# Patient Record
Sex: MALE | Race: WHITE | NOT HISPANIC OR LATINO | Employment: FULL TIME | URBAN - METROPOLITAN AREA
[De-identification: names, ages, dates, MRNs, and addresses within clinical notes are randomized per-mention and may not be internally consistent; named-entity substitution may affect disease eponyms.]

---

## 2023-07-16 PROBLEM — F41.9 ANXIETY: Status: ACTIVE | Noted: 2023-07-16

## 2023-07-16 PROBLEM — E55.9 VITAMIN D DEFICIENCY: Status: ACTIVE | Noted: 2023-07-16

## 2023-07-16 PROBLEM — J45.909 ASTHMA (HHS-HCC): Status: ACTIVE | Noted: 2023-07-16

## 2023-07-16 PROBLEM — R63.2 BINGE EATING: Status: ACTIVE | Noted: 2023-07-16

## 2023-07-16 PROBLEM — E66.01 OBESITY, MORBID, BMI 40.0-49.9 (MULTI): Status: ACTIVE | Noted: 2023-07-16

## 2023-07-16 PROBLEM — R03.0 BLOOD PRESSURE ELEVATED WITHOUT HISTORY OF HTN: Status: ACTIVE | Noted: 2023-07-16

## 2023-07-16 PROBLEM — G47.19 EXCESSIVE DAYTIME SLEEPINESS: Status: ACTIVE | Noted: 2023-07-16

## 2023-07-16 PROBLEM — G47.33 OSA (OBSTRUCTIVE SLEEP APNEA): Status: ACTIVE | Noted: 2023-07-16

## 2023-07-16 PROBLEM — F41.8 DEPRESSION WITH ANXIETY: Status: ACTIVE | Noted: 2023-07-16

## 2023-07-16 PROBLEM — B00.9 HERPES SIMPLEX: Status: ACTIVE | Noted: 2023-07-16

## 2023-07-16 RX ORDER — ERGOCALCIFEROL 1.25 MG/1
CAPSULE ORAL
COMMUNITY
End: 2023-08-03 | Stop reason: ALTCHOICE

## 2023-07-16 RX ORDER — OMEPRAZOLE 20 MG/1
CAPSULE, DELAYED RELEASE ORAL
COMMUNITY
End: 2023-08-03 | Stop reason: ALTCHOICE

## 2023-07-16 RX ORDER — ESCITALOPRAM OXALATE 20 MG/1
1 TABLET ORAL DAILY
COMMUNITY
Start: 2018-06-14 | End: 2023-08-03 | Stop reason: WASHOUT

## 2023-07-16 RX ORDER — MOMETASONE FUROATE AND FORMOTEROL FUMARATE DIHYDRATE 100; 5 UG/1; UG/1
AEROSOL RESPIRATORY (INHALATION)
COMMUNITY
Start: 2018-02-09 | End: 2023-08-03 | Stop reason: SDUPTHER

## 2023-07-16 RX ORDER — LORATADINE 10 MG/1
TABLET ORAL
COMMUNITY
End: 2023-11-21 | Stop reason: ALTCHOICE

## 2023-07-16 RX ORDER — SERTRALINE HYDROCHLORIDE 50 MG/1
TABLET, FILM COATED ORAL
COMMUNITY
Start: 2022-07-05 | End: 2023-08-03 | Stop reason: ALTCHOICE

## 2023-07-16 RX ORDER — ALBUTEROL SULFATE 90 UG/1
AEROSOL, METERED RESPIRATORY (INHALATION)
COMMUNITY
Start: 2013-03-25 | End: 2023-08-03 | Stop reason: SDUPTHER

## 2023-07-17 ENCOUNTER — APPOINTMENT (OUTPATIENT)
Dept: PRIMARY CARE | Facility: CLINIC | Age: 48
End: 2023-07-17
Payer: COMMERCIAL

## 2023-08-02 ENCOUNTER — APPOINTMENT (OUTPATIENT)
Dept: PRIMARY CARE | Facility: CLINIC | Age: 48
End: 2023-08-02
Payer: COMMERCIAL

## 2023-08-03 ENCOUNTER — OFFICE VISIT (OUTPATIENT)
Dept: PRIMARY CARE | Facility: CLINIC | Age: 48
End: 2023-08-03
Payer: COMMERCIAL

## 2023-08-03 VITALS
SYSTOLIC BLOOD PRESSURE: 131 MMHG | OXYGEN SATURATION: 98 % | WEIGHT: 284 LBS | HEART RATE: 80 BPM | DIASTOLIC BLOOD PRESSURE: 83 MMHG | BODY MASS INDEX: 39.76 KG/M2 | TEMPERATURE: 98.6 F | HEIGHT: 71 IN

## 2023-08-03 DIAGNOSIS — F41.9 ANXIETY: ICD-10-CM

## 2023-08-03 DIAGNOSIS — Z00.00 ROUTINE GENERAL MEDICAL EXAMINATION AT A HEALTH CARE FACILITY: ICD-10-CM

## 2023-08-03 DIAGNOSIS — J45.909 ASTHMA, UNSPECIFIED ASTHMA SEVERITY, UNSPECIFIED WHETHER COMPLICATED, UNSPECIFIED WHETHER PERSISTENT (HHS-HCC): Primary | ICD-10-CM

## 2023-08-03 DIAGNOSIS — G47.33 OSA (OBSTRUCTIVE SLEEP APNEA): ICD-10-CM

## 2023-08-03 DIAGNOSIS — F41.8 DEPRESSION WITH ANXIETY: ICD-10-CM

## 2023-08-03 DIAGNOSIS — I10 HYPERTENSION, UNSPECIFIED TYPE: ICD-10-CM

## 2023-08-03 DIAGNOSIS — G47.19 EXCESSIVE DAYTIME SLEEPINESS: ICD-10-CM

## 2023-08-03 DIAGNOSIS — R61 EXCESSIVE SWEATING: ICD-10-CM

## 2023-08-03 DIAGNOSIS — E66.01 OBESITY, MORBID, BMI 40.0-49.9 (MULTI): ICD-10-CM

## 2023-08-03 PROCEDURE — 3079F DIAST BP 80-89 MM HG: CPT | Performed by: INTERNAL MEDICINE

## 2023-08-03 PROCEDURE — 93000 ELECTROCARDIOGRAM COMPLETE: CPT | Performed by: INTERNAL MEDICINE

## 2023-08-03 PROCEDURE — 99204 OFFICE O/P NEW MOD 45 MIN: CPT | Performed by: INTERNAL MEDICINE

## 2023-08-03 PROCEDURE — 3075F SYST BP GE 130 - 139MM HG: CPT | Performed by: INTERNAL MEDICINE

## 2023-08-03 PROCEDURE — 1036F TOBACCO NON-USER: CPT | Performed by: INTERNAL MEDICINE

## 2023-08-03 RX ORDER — TRAZODONE HYDROCHLORIDE 50 MG/1
50 TABLET ORAL NIGHTLY PRN
Qty: 30 TABLET | Refills: 0 | Status: SHIPPED | OUTPATIENT
Start: 2023-08-03 | End: 2023-08-26

## 2023-08-03 RX ORDER — MOMETASONE FUROATE AND FORMOTEROL FUMARATE DIHYDRATE 100; 5 UG/1; UG/1
1 AEROSOL RESPIRATORY (INHALATION)
Qty: 8.8 G | Refills: 1 | Status: SHIPPED | OUTPATIENT
Start: 2023-08-03 | End: 2024-05-18 | Stop reason: SDUPTHER

## 2023-08-03 RX ORDER — OLMESARTAN MEDOXOMIL 20 MG/1
TABLET ORAL
COMMUNITY
Start: 2023-05-16 | End: 2023-09-15 | Stop reason: SDUPTHER

## 2023-08-03 RX ORDER — ALBUTEROL SULFATE 90 UG/1
1 AEROSOL, METERED RESPIRATORY (INHALATION) EVERY 6 HOURS PRN
Qty: 18 G | Refills: 1 | Status: SHIPPED | OUTPATIENT
Start: 2023-08-03 | End: 2023-08-03 | Stop reason: SDUPTHER

## 2023-08-03 RX ORDER — MOMETASONE FUROATE AND FORMOTEROL FUMARATE DIHYDRATE 100; 5 UG/1; UG/1
1 AEROSOL RESPIRATORY (INHALATION)
Qty: 8.8 G | Refills: 1 | Status: SHIPPED | OUTPATIENT
Start: 2023-08-03 | End: 2023-08-03 | Stop reason: SDUPTHER

## 2023-08-03 RX ORDER — ALBUTEROL SULFATE 90 UG/1
2 AEROSOL, METERED RESPIRATORY (INHALATION) EVERY 6 HOURS PRN
Qty: 8.5 G | Refills: 3 | Status: SHIPPED | OUTPATIENT
Start: 2023-08-03 | End: 2024-08-02

## 2023-08-03 RX ORDER — BUSPIRONE HYDROCHLORIDE 10 MG/1
10 TABLET ORAL 2 TIMES DAILY
Qty: 60 TABLET | Refills: 1 | Status: SHIPPED | OUTPATIENT
Start: 2023-08-03 | End: 2023-09-13 | Stop reason: SDUPTHER

## 2023-08-03 RX ORDER — ALBUTEROL SULFATE 90 UG/1
1 AEROSOL, METERED RESPIRATORY (INHALATION) EVERY 6 HOURS PRN
Qty: 18 G | Refills: 1 | Status: SHIPPED | OUTPATIENT
Start: 2023-08-03 | End: 2023-08-03

## 2023-08-03 ASSESSMENT — LIFESTYLE VARIABLES
AUDIT-C TOTAL SCORE: 2
HOW OFTEN DO YOU HAVE A DRINK CONTAINING ALCOHOL: MONTHLY OR LESS
SKIP TO QUESTIONS 9-10: 0
HOW OFTEN DO YOU HAVE SIX OR MORE DRINKS ON ONE OCCASION: LESS THAN MONTHLY
HOW MANY STANDARD DRINKS CONTAINING ALCOHOL DO YOU HAVE ON A TYPICAL DAY: 1 OR 2

## 2023-08-03 ASSESSMENT — PATIENT HEALTH QUESTIONNAIRE - PHQ9
2. FEELING DOWN, DEPRESSED OR HOPELESS: NOT AT ALL
1. LITTLE INTEREST OR PLEASURE IN DOING THINGS: NOT AT ALL
SUM OF ALL RESPONSES TO PHQ9 QUESTIONS 1 AND 2: 0

## 2023-08-03 ASSESSMENT — COLUMBIA-SUICIDE SEVERITY RATING SCALE - C-SSRS: 1. IN THE PAST MONTH, HAVE YOU WISHED YOU WERE DEAD OR WISHED YOU COULD GO TO SLEEP AND NOT WAKE UP?: NO

## 2023-08-03 ASSESSMENT — PAIN SCALES - GENERAL: PAINLEVEL: 0-NO PAIN

## 2023-08-03 NOTE — PROGRESS NOTES
"Subjective   Patient ID: James Brambila is a 47 y.o. male who presents for New Patient Visit and Anxiety.    HPI     Review of Systems    Objective   /83   Pulse 80   Temp 37 °C (98.6 °F)   Ht 1.803 m (5' 11\")   Wt 129 kg (284 lb)   SpO2 98%   BMI 39.61 kg/m²     Physical Exam    Assessment/Plan          "

## 2023-08-03 NOTE — PROGRESS NOTES
"Subjective   Patient ID: James Brambila is a 47 y.o. male who presents for New Patient Visit and Anxiety.    HPIPatient presents to clinic complaining of insomnia, feeling anxious at times, having difficulty in relaxation, unable to enjoy his life, gets worried about several small things, excessive sweating persistent weight gain, outbursts of anger ,crying spells and has been feeling uptight over the past several years.  He has tried Zoloft, Lexapro and Prozac without any significant improvement in his symptoms.  He denies any suicidal ideation, agitation, restlessness, belligerent behavior, shortness of breath, abdominal pain and nausea vomiting.  He is also requesting a routine physical examination.  EKG done in the office shows sinus rhythm at 83 bpm with normal axis normal interval without any ischemic changes.  He wants to get established with the office and had been a former patient of Dr. Cabrera   Past medical history significant for Depression/anxiety,morbid obesity,asthma,anni on cpap,and Hypertension    Review of Systems   Constitutional: Negative.    HENT: Negative.     Eyes: Negative.    Respiratory: Negative.     Cardiovascular: Negative.    Gastrointestinal: Negative.    Endocrine: Negative.         With excessive sweating   Genitourinary: Negative.    Musculoskeletal: Negative.    Skin: Negative.    Allergic/Immunologic: Negative.    Neurological: Negative.    Hematological: Negative.    Psychiatric/Behavioral:  Positive for decreased concentration and sleep disturbance. The patient is nervous/anxious.        Objective   /83   Pulse 80   Temp 37 °C (98.6 °F)   Ht 1.803 m (5' 11\")   Wt 129 kg (284 lb)   SpO2 98%   BMI 39.61 kg/m²     Physical Exam  Constitutional:       Appearance: Normal appearance. He is obese.   HENT:      Right Ear: Tympanic membrane normal.      Left Ear: Tympanic membrane and ear canal normal.      Nose: Nose normal.   Neck:      Vascular: No carotid bruit. "   Cardiovascular:      Rate and Rhythm: Normal rate.   Pulmonary:      Effort: No respiratory distress.      Breath sounds: No stridor. No wheezing.   Abdominal:      Palpations: Abdomen is soft.      Tenderness: There is no guarding or rebound.   Skin:     Coloration: Skin is not jaundiced.   Neurological:      General: No focal deficit present.      Mental Status: He is alert and oriented to person, place, and time.   Psychiatric:         Mood and Affect: Mood normal.         Assessment/Plan   Patient is given trial with trazodone and BuSpar regarding depression and anxiety.  He will be referred to psychiatry clinic for further management of his above-mentioned symptoms.  He probably has component obsessive-compulsive disorder.  He is encouraged to diet, exercise and do lifestyle modification for morbid obesity.  He will be scheduled for routine blood work.  He will return to clinic in 2 weeks for follow-up visit.

## 2023-08-04 ENCOUNTER — LAB (OUTPATIENT)
Dept: LAB | Facility: LAB | Age: 48
End: 2023-08-04
Payer: COMMERCIAL

## 2023-08-04 DIAGNOSIS — R61 EXCESSIVE SWEATING: ICD-10-CM

## 2023-08-04 DIAGNOSIS — E66.01 OBESITY, MORBID, BMI 40.0-49.9 (MULTI): ICD-10-CM

## 2023-08-04 DIAGNOSIS — Z00.00 ROUTINE GENERAL MEDICAL EXAMINATION AT A HEALTH CARE FACILITY: ICD-10-CM

## 2023-08-04 DIAGNOSIS — I10 HYPERTENSION, UNSPECIFIED TYPE: ICD-10-CM

## 2023-08-04 LAB
ALANINE AMINOTRANSFERASE (SGPT) (U/L) IN SER/PLAS: 31 U/L (ref 10–52)
ALBUMIN (G/DL) IN SER/PLAS: 4.3 G/DL (ref 3.4–5)
ALKALINE PHOSPHATASE (U/L) IN SER/PLAS: 81 U/L (ref 33–120)
ANION GAP IN SER/PLAS: 14 MMOL/L (ref 10–20)
APPEARANCE, URINE: CLEAR
ASPARTATE AMINOTRANSFERASE (SGOT) (U/L) IN SER/PLAS: 18 U/L (ref 9–39)
BASOPHILS (10*3/UL) IN BLOOD BY AUTOMATED COUNT: 0.05 X10E9/L (ref 0–0.1)
BASOPHILS/100 LEUKOCYTES IN BLOOD BY AUTOMATED COUNT: 0.7 % (ref 0–2)
BILIRUBIN TOTAL (MG/DL) IN SER/PLAS: 0.4 MG/DL (ref 0–1.2)
BILIRUBIN, URINE: NEGATIVE
BLOOD, URINE: NEGATIVE
CALCIUM (MG/DL) IN SER/PLAS: 9.3 MG/DL (ref 8.6–10.6)
CARBON DIOXIDE, TOTAL (MMOL/L) IN SER/PLAS: 29 MMOL/L (ref 21–32)
CHLORIDE (MMOL/L) IN SER/PLAS: 100 MMOL/L (ref 98–107)
CHOLESTEROL (MG/DL) IN SER/PLAS: 164 MG/DL (ref 0–199)
CHOLESTEROL IN HDL (MG/DL) IN SER/PLAS: 36.2 MG/DL
CHOLESTEROL/HDL RATIO: 4.5
COLOR, URINE: YELLOW
CREATININE (MG/DL) IN SER/PLAS: 0.89 MG/DL (ref 0.5–1.3)
EOSINOPHILS (10*3/UL) IN BLOOD BY AUTOMATED COUNT: 0.13 X10E9/L (ref 0–0.7)
EOSINOPHILS/100 LEUKOCYTES IN BLOOD BY AUTOMATED COUNT: 1.7 % (ref 0–6)
ERYTHROCYTE DISTRIBUTION WIDTH (RATIO) BY AUTOMATED COUNT: 12.9 % (ref 11.5–14.5)
ERYTHROCYTE MEAN CORPUSCULAR HEMOGLOBIN CONCENTRATION (G/DL) BY AUTOMATED: 31.3 G/DL (ref 32–36)
ERYTHROCYTE MEAN CORPUSCULAR VOLUME (FL) BY AUTOMATED COUNT: 89 FL (ref 80–100)
ERYTHROCYTES (10*6/UL) IN BLOOD BY AUTOMATED COUNT: 4.92 X10E12/L (ref 4.5–5.9)
ESTIMATED AVERAGE GLUCOSE FOR HBA1C: 332 MG/DL
GFR MALE: >90 ML/MIN/1.73M2
GLUCOSE (MG/DL) IN SER/PLAS: 371 MG/DL (ref 74–99)
GLUCOSE, URINE: ABNORMAL MG/DL
HEMATOCRIT (%) IN BLOOD BY AUTOMATED COUNT: 43.8 % (ref 41–52)
HEMOGLOBIN (G/DL) IN BLOOD: 13.7 G/DL (ref 13.5–17.5)
HEMOGLOBIN A1C/HEMOGLOBIN TOTAL IN BLOOD: 13.2 %
IMMATURE GRANULOCYTES/100 LEUKOCYTES IN BLOOD BY AUTOMATED COUNT: 1.2 % (ref 0–0.9)
KETONES, URINE: NEGATIVE MG/DL
LDL: ABNORMAL MG/DL (ref 0–99)
LEUKOCYTE ESTERASE, URINE: NEGATIVE
LEUKOCYTES (10*3/UL) IN BLOOD BY AUTOMATED COUNT: 7.6 X10E9/L (ref 4.4–11.3)
LYMPHOCYTES (10*3/UL) IN BLOOD BY AUTOMATED COUNT: 2.33 X10E9/L (ref 1.2–4.8)
LYMPHOCYTES/100 LEUKOCYTES IN BLOOD BY AUTOMATED COUNT: 30.8 % (ref 13–44)
MONOCYTES (10*3/UL) IN BLOOD BY AUTOMATED COUNT: 0.5 X10E9/L (ref 0.1–1)
MONOCYTES/100 LEUKOCYTES IN BLOOD BY AUTOMATED COUNT: 6.6 % (ref 2–10)
NEUTROPHILS (10*3/UL) IN BLOOD BY AUTOMATED COUNT: 4.46 X10E9/L (ref 1.2–7.7)
NEUTROPHILS/100 LEUKOCYTES IN BLOOD BY AUTOMATED COUNT: 59 % (ref 40–80)
NITRITE, URINE: NEGATIVE
NON HDL CHOLESTEROL: 128 MG/DL
NRBC (PER 100 WBCS) BY AUTOMATED COUNT: 0 /100 WBC (ref 0–0)
PH, URINE: 5 (ref 5–8)
PLATELETS (10*3/UL) IN BLOOD AUTOMATED COUNT: 244 X10E9/L (ref 150–450)
POTASSIUM (MMOL/L) IN SER/PLAS: 3.9 MMOL/L (ref 3.5–5.3)
PROTEIN TOTAL: 7 G/DL (ref 6.4–8.2)
PROTEIN, URINE: NEGATIVE MG/DL
SODIUM (MMOL/L) IN SER/PLAS: 139 MMOL/L (ref 136–145)
SPECIFIC GRAVITY, URINE: 1.04 (ref 1–1.03)
THYROTROPIN (MIU/L) IN SER/PLAS BY DETECTION LIMIT <= 0.05 MIU/L: 3.49 MIU/L (ref 0.44–3.98)
TRIGLYCERIDE (MG/DL) IN SER/PLAS: 497 MG/DL (ref 0–149)
UREA NITROGEN (MG/DL) IN SER/PLAS: 11 MG/DL (ref 6–23)
UROBILINOGEN, URINE: <2 MG/DL (ref 0–1.9)
VLDL: ABNORMAL MG/DL (ref 0–40)

## 2023-08-04 PROCEDURE — 85025 COMPLETE CBC W/AUTO DIFF WBC: CPT

## 2023-08-04 PROCEDURE — 80061 LIPID PANEL: CPT

## 2023-08-04 PROCEDURE — 36415 COLL VENOUS BLD VENIPUNCTURE: CPT

## 2023-08-04 PROCEDURE — 81003 URINALYSIS AUTO W/O SCOPE: CPT

## 2023-08-04 PROCEDURE — 83036 HEMOGLOBIN GLYCOSYLATED A1C: CPT

## 2023-08-04 PROCEDURE — 84443 ASSAY THYROID STIM HORMONE: CPT

## 2023-08-04 PROCEDURE — 80053 COMPREHEN METABOLIC PANEL: CPT

## 2023-08-05 DIAGNOSIS — E11.9 TYPE 2 DIABETES MELLITUS WITHOUT COMPLICATION, WITHOUT LONG-TERM CURRENT USE OF INSULIN (MULTI): Primary | ICD-10-CM

## 2023-08-05 RX ORDER — METFORMIN HYDROCHLORIDE 1000 MG/1
1000 TABLET ORAL
Qty: 60 TABLET | Refills: 2 | Status: SHIPPED | OUTPATIENT
Start: 2023-08-05 | End: 2023-09-13 | Stop reason: SDUPTHER

## 2023-08-05 RX ORDER — GLIMEPIRIDE 4 MG/1
4 TABLET ORAL 2 TIMES DAILY
Qty: 60 TABLET | Refills: 2 | Status: SHIPPED | OUTPATIENT
Start: 2023-08-05 | End: 2023-09-13 | Stop reason: SDUPTHER

## 2023-08-06 ASSESSMENT — ENCOUNTER SYMPTOMS
ALLERGIC/IMMUNOLOGIC NEGATIVE: 1
SLEEP DISTURBANCE: 1
HEMATOLOGIC/LYMPHATIC NEGATIVE: 1
RESPIRATORY NEGATIVE: 1
NERVOUS/ANXIOUS: 1
DECREASED CONCENTRATION: 1
CONSTITUTIONAL NEGATIVE: 1
CARDIOVASCULAR NEGATIVE: 1
NEUROLOGICAL NEGATIVE: 1
EYES NEGATIVE: 1
ENDOCRINE NEGATIVE: 1
MUSCULOSKELETAL NEGATIVE: 1
GASTROINTESTINAL NEGATIVE: 1

## 2023-08-07 ASSESSMENT — ENCOUNTER SYMPTOMS
BLACKOUTS: 0
HUNGER: 1
POLYPHAGIA: 1
SEIZURES: 0
HEADACHES: 1
NERVOUS/ANXIOUS: 1
SPEECH DIFFICULTY: 1
POLYDIPSIA: 1
TREMORS: 0
DIZZINESS: 0
BLURRED VISION: 0
CONFUSION: 1
WEIGHT LOSS: 1
FATIGUE: 1
VISUAL CHANGE: 0
WEAKNESS: 1
SWEATS: 1

## 2023-08-09 ENCOUNTER — OFFICE VISIT (OUTPATIENT)
Dept: PRIMARY CARE | Facility: CLINIC | Age: 48
End: 2023-08-09
Payer: COMMERCIAL

## 2023-08-09 VITALS
BODY MASS INDEX: 39.76 KG/M2 | HEIGHT: 71 IN | TEMPERATURE: 98.6 F | DIASTOLIC BLOOD PRESSURE: 79 MMHG | WEIGHT: 284 LBS | SYSTOLIC BLOOD PRESSURE: 124 MMHG | OXYGEN SATURATION: 98 % | HEART RATE: 98 BPM

## 2023-08-09 DIAGNOSIS — E78.5 DYSLIPIDEMIA: ICD-10-CM

## 2023-08-09 DIAGNOSIS — E11.9 TYPE 2 DIABETES MELLITUS WITHOUT COMPLICATION, WITHOUT LONG-TERM CURRENT USE OF INSULIN (MULTI): ICD-10-CM

## 2023-08-09 DIAGNOSIS — F41.9 ANXIETY: ICD-10-CM

## 2023-08-09 DIAGNOSIS — J45.909 ASTHMA, UNSPECIFIED ASTHMA SEVERITY, UNSPECIFIED WHETHER COMPLICATED, UNSPECIFIED WHETHER PERSISTENT (HHS-HCC): Primary | ICD-10-CM

## 2023-08-09 PROCEDURE — 3078F DIAST BP <80 MM HG: CPT | Performed by: INTERNAL MEDICINE

## 2023-08-09 PROCEDURE — 4010F ACE/ARB THERAPY RXD/TAKEN: CPT | Performed by: INTERNAL MEDICINE

## 2023-08-09 PROCEDURE — 3074F SYST BP LT 130 MM HG: CPT | Performed by: INTERNAL MEDICINE

## 2023-08-09 PROCEDURE — 1036F TOBACCO NON-USER: CPT | Performed by: INTERNAL MEDICINE

## 2023-08-09 PROCEDURE — 3046F HEMOGLOBIN A1C LEVEL >9.0%: CPT | Performed by: INTERNAL MEDICINE

## 2023-08-09 PROCEDURE — 99214 OFFICE O/P EST MOD 30 MIN: CPT | Performed by: INTERNAL MEDICINE

## 2023-08-09 RX ORDER — DEXTROSE 4 G
1 TABLET,CHEWABLE ORAL ONCE
Qty: 1 EACH | Refills: 0 | Status: SHIPPED | OUTPATIENT
Start: 2023-08-09 | End: 2023-08-09

## 2023-08-09 RX ORDER — BLOOD SUGAR DIAGNOSTIC
1 STRIP MISCELLANEOUS 2 TIMES DAILY
Qty: 100 STRIP | Refills: 2 | Status: SHIPPED | OUTPATIENT
Start: 2023-08-09 | End: 2024-08-08

## 2023-08-09 ASSESSMENT — PATIENT HEALTH QUESTIONNAIRE - PHQ9
1. LITTLE INTEREST OR PLEASURE IN DOING THINGS: NOT AT ALL
2. FEELING DOWN, DEPRESSED OR HOPELESS: NOT AT ALL
SUM OF ALL RESPONSES TO PHQ9 QUESTIONS 1 AND 2: 0

## 2023-08-09 ASSESSMENT — COLUMBIA-SUICIDE SEVERITY RATING SCALE - C-SSRS: 1. IN THE PAST MONTH, HAVE YOU WISHED YOU WERE DEAD OR WISHED YOU COULD GO TO SLEEP AND NOT WAKE UP?: NO

## 2023-08-09 NOTE — PROGRESS NOTES
"Subjective   Patient ID: James Brambila is a 47 y.o. male who presents for Follow-up (2 week ).    HPI     Review of Systems    Objective   /79   Pulse 98   Temp 37 °C (98.6 °F)   Ht 1.803 m (5' 11\")   Wt 129 kg (284 lb)   SpO2 98%   BMI 39.61 kg/m²     Physical Exam    Assessment/Plan          "

## 2023-08-09 NOTE — PROGRESS NOTES
Subjective   Patient ID: James Brambila is a 47 y.o. male who presents for Follow-up (2 week ).    Diabetes  He has type 2 diabetes mellitus. No MedicAlert identification noted. The initial diagnosis of diabetes was made 2 days ago. Hypoglycemia symptoms include confusion, headaches, hunger, mood changes, nervousness/anxiousness, sleepiness, speech difficulty and sweats. Pertinent negatives for hypoglycemia include no dizziness, pallor, seizures or tremors. Associated symptoms include fatigue, foot paresthesias, polydipsia, polyphagia, polyuria, weakness and weight loss. Pertinent negatives for diabetes include no blurred vision, no chest pain and no visual change. Pertinent negatives for hypoglycemia complications include no blackouts, no hospitalization, no nocturnal hypoglycemia, no required assistance and no required glucagon injection. Symptoms are stable. Pertinent negatives for diabetic complications include no CVA, heart disease, impotence, nephropathy, peripheral neuropathy, PVD or retinopathy. Risk factors for coronary artery disease include hypertension, obesity, sedentary lifestyle and stress. Current diabetic treatment includes oral agent (dual therapy). He is compliant with treatment all of the time. His weight is stable. When asked about meal planning, he reported none. He has not had a previous visit with a dietitian. He rarely participates in exercise. He does not see a podiatrist.Eye exam is current.    patient was requested to return to clinic to discuss abnormal test results.  He is still having intermittent anxiety and some insomnia.  He denies any cough, abdominal pain, chest pain and palpitation.  Laboratory studies done shows hemoglobin A1c of 13.2%, serum glucose of 371, urinalysis revealed glucosuria,, triglycerides of 497 and other studies have been reviewed and discussed with him.  He has past history of depression/anxiety,morbid obesity,asthma,anni on cpap,and Hypertension       Review of  "Systems   Constitutional:  Positive for fatigue and weight loss.   Eyes:  Negative for blurred vision.   Cardiovascular:  Negative for chest pain.   Endocrine: Positive for polydipsia, polyphagia and polyuria.   Genitourinary:  Negative for impotence.   Skin:  Negative for pallor.   Neurological:  Positive for speech difficulty, weakness and headaches. Negative for dizziness, tremors and seizures.   Psychiatric/Behavioral:  Positive for confusion. The patient is nervous/anxious.        Objective   /79   Pulse 98   Temp 37 °C (98.6 °F)   Ht 1.803 m (5' 11\")   Wt 129 kg (284 lb)   SpO2 98%   BMI 39.61 kg/m²     Physical Exam  Constitutional:       Appearance: Normal appearance. He is obese.   HENT:      Right Ear: Tympanic membrane normal.      Left Ear: Tympanic membrane and ear canal normal.      Nose: Nose normal.   Neck:      Vascular: No carotid bruit.   Cardiovascular:      Rate and Rhythm: Normal rate.   Pulmonary:      Effort: No respiratory distress.      Breath sounds: No stridor. No wheezing.   Abdominal:      Palpations: Abdomen is soft.      Tenderness: There is no abdominal tenderness. There is no guarding or rebound.   Skin:     Coloration: Skin is not jaundiced.      Findings: No bruising.   Neurological:      General: No focal deficit present.      Mental Status: He is alert and oriented to person, place, and time.   Psychiatric:         Mood and Affect: Mood normal.         Assessment/Plan    patient is started on metformin, glimepiride and Ozempic regarding uncontrolled diabetes.  He is encouraged to diet, exercise and do lifestyle modification for obesity.  He is also scheduled to be seen by psychiatrist regarding his depression and anxiety.  He is counseled and educated about home glucose monitoring.  He will continue current medications and will return to clinic in 3 months for follow-up visit.       "

## 2023-08-12 ASSESSMENT — ENCOUNTER SYMPTOMS
SEIZURES: 0
SWEATS: 1
SPEECH DIFFICULTY: 1
WEIGHT LOSS: 1
FATIGUE: 1
POLYDIPSIA: 1
HEADACHES: 1
HUNGER: 1
VISUAL CHANGE: 0
CONFUSION: 1
NERVOUS/ANXIOUS: 1
DIZZINESS: 0
BLURRED VISION: 0
TREMORS: 0
BLACKOUTS: 0
POLYPHAGIA: 1
WEAKNESS: 1

## 2023-08-17 ENCOUNTER — APPOINTMENT (OUTPATIENT)
Dept: PRIMARY CARE | Facility: CLINIC | Age: 48
End: 2023-08-17
Payer: COMMERCIAL

## 2023-08-26 DIAGNOSIS — F41.9 ANXIETY: ICD-10-CM

## 2023-08-26 DIAGNOSIS — F41.8 DEPRESSION WITH ANXIETY: ICD-10-CM

## 2023-08-26 RX ORDER — TRAZODONE HYDROCHLORIDE 50 MG/1
50 TABLET ORAL NIGHTLY PRN
Qty: 30 TABLET | Refills: 0 | Status: SHIPPED | OUTPATIENT
Start: 2023-08-26 | End: 2023-10-04 | Stop reason: ALTCHOICE

## 2023-09-13 DIAGNOSIS — E11.9 TYPE 2 DIABETES MELLITUS WITHOUT COMPLICATION, WITHOUT LONG-TERM CURRENT USE OF INSULIN (MULTI): ICD-10-CM

## 2023-09-13 DIAGNOSIS — F41.8 DEPRESSION WITH ANXIETY: ICD-10-CM

## 2023-09-13 DIAGNOSIS — F41.9 ANXIETY: ICD-10-CM

## 2023-09-13 RX ORDER — METFORMIN HYDROCHLORIDE 1000 MG/1
1000 TABLET ORAL
Qty: 60 TABLET | Refills: 2 | Status: SHIPPED | OUTPATIENT
Start: 2023-09-13 | End: 2023-09-13 | Stop reason: SDUPTHER

## 2023-09-13 RX ORDER — GLIMEPIRIDE 4 MG/1
4 TABLET ORAL 2 TIMES DAILY
Qty: 60 TABLET | Refills: 2 | Status: SHIPPED | OUTPATIENT
Start: 2023-09-13 | End: 2023-09-13 | Stop reason: SDUPTHER

## 2023-09-13 RX ORDER — BUSPIRONE HYDROCHLORIDE 10 MG/1
10 TABLET ORAL 2 TIMES DAILY
Qty: 60 TABLET | Refills: 1 | Status: SHIPPED | OUTPATIENT
Start: 2023-09-13 | End: 2023-11-09 | Stop reason: SDUPTHER

## 2023-09-14 RX ORDER — GLIMEPIRIDE 4 MG/1
4 TABLET ORAL 2 TIMES DAILY
Qty: 60 TABLET | Refills: 2 | Status: SHIPPED | OUTPATIENT
Start: 2023-09-14 | End: 2023-09-18 | Stop reason: SDUPTHER

## 2023-09-14 RX ORDER — METFORMIN HYDROCHLORIDE 1000 MG/1
1000 TABLET ORAL
Qty: 60 TABLET | Refills: 2 | Status: SHIPPED | OUTPATIENT
Start: 2023-09-14 | End: 2023-09-18 | Stop reason: SDUPTHER

## 2023-09-15 DIAGNOSIS — I10 HYPERTENSION, UNSPECIFIED TYPE: Primary | ICD-10-CM

## 2023-09-15 RX ORDER — OLMESARTAN MEDOXOMIL 20 MG/1
20 TABLET ORAL DAILY
Qty: 90 TABLET | Refills: 1 | Status: SHIPPED | OUTPATIENT
Start: 2023-09-15 | End: 2023-11-18

## 2023-09-18 DIAGNOSIS — E11.9 TYPE 2 DIABETES MELLITUS WITHOUT COMPLICATION, WITHOUT LONG-TERM CURRENT USE OF INSULIN (MULTI): ICD-10-CM

## 2023-09-18 RX ORDER — GLIMEPIRIDE 4 MG/1
4 TABLET ORAL 2 TIMES DAILY
Qty: 60 TABLET | Refills: 2 | Status: SHIPPED | OUTPATIENT
Start: 2023-09-18 | End: 2023-09-19 | Stop reason: SDUPTHER

## 2023-09-18 RX ORDER — METFORMIN HYDROCHLORIDE 1000 MG/1
1000 TABLET ORAL
Qty: 60 TABLET | Refills: 2 | Status: SHIPPED | OUTPATIENT
Start: 2023-09-18 | End: 2023-09-19 | Stop reason: SDUPTHER

## 2023-09-19 DIAGNOSIS — E11.9 TYPE 2 DIABETES MELLITUS WITHOUT COMPLICATION, WITHOUT LONG-TERM CURRENT USE OF INSULIN (MULTI): ICD-10-CM

## 2023-09-19 RX ORDER — GLIMEPIRIDE 4 MG/1
4 TABLET ORAL 2 TIMES DAILY
Qty: 60 TABLET | Refills: 2 | Status: SHIPPED | OUTPATIENT
Start: 2023-09-19 | End: 2024-01-04

## 2023-09-19 RX ORDER — METFORMIN HYDROCHLORIDE 1000 MG/1
1000 TABLET ORAL
Qty: 60 TABLET | Refills: 2 | Status: SHIPPED | OUTPATIENT
Start: 2023-09-19 | End: 2024-01-04

## 2023-10-02 PROBLEM — J45.40 MODERATE PERSISTENT ASTHMA WITHOUT COMPLICATION (HHS-HCC): Status: ACTIVE | Noted: 2019-07-07

## 2023-10-02 PROBLEM — G47.33 OSA ON CPAP: Status: ACTIVE | Noted: 2021-05-24

## 2023-10-02 PROBLEM — F43.10 PTSD (POST-TRAUMATIC STRESS DISORDER): Status: ACTIVE | Noted: 2023-10-02

## 2023-10-02 PROBLEM — R53.83 OTHER FATIGUE: Status: ACTIVE | Noted: 2021-05-24

## 2023-10-02 PROBLEM — K21.9 GASTROESOPHAGEAL REFLUX DISEASE: Status: ACTIVE | Noted: 2021-06-08

## 2023-10-02 PROBLEM — H92.09 OTALGIA: Status: ACTIVE | Noted: 2019-07-02

## 2023-10-02 PROBLEM — R73.9 HYPERGLYCEMIA: Status: ACTIVE | Noted: 2022-09-08

## 2023-10-02 PROBLEM — Z88.9 HISTORY OF ALLERGY: Status: ACTIVE | Noted: 2023-10-02

## 2023-10-02 PROBLEM — R29.898 WEAKNESS OF BACK: Status: ACTIVE | Noted: 2022-09-22

## 2023-10-02 PROBLEM — R10.9 ABDOMINAL PAIN: Status: ACTIVE | Noted: 2023-10-02

## 2023-10-02 PROBLEM — F42.9 OBSESSIVE COMPULSIVE DISORDER: Status: ACTIVE | Noted: 2023-10-02

## 2023-10-02 PROBLEM — D50.9 IRON DEFICIENCY ANEMIA: Status: ACTIVE | Noted: 2023-10-02

## 2023-10-02 PROBLEM — E78.49 HYPERLIPIDEMIA, FAMILIAL, HIGH LDL: Status: ACTIVE | Noted: 2022-09-08

## 2023-10-02 PROBLEM — F41.1 GENERALIZED ANXIETY DISORDER: Status: ACTIVE | Noted: 2021-04-13

## 2023-10-02 PROBLEM — G25.81 RESTLESS LEG SYNDROME: Status: ACTIVE | Noted: 2023-10-02

## 2023-10-02 PROBLEM — R21 SKIN ERUPTION: Status: ACTIVE | Noted: 2019-07-02

## 2023-10-02 PROBLEM — F41.0 PANIC DISORDER WITHOUT AGORAPHOBIA: Status: ACTIVE | Noted: 2019-07-02

## 2023-10-02 PROBLEM — M54.9 BACKACHE: Status: ACTIVE | Noted: 2019-07-02

## 2023-10-02 PROBLEM — I10 HYPERTENSION, ESSENTIAL: Status: ACTIVE | Noted: 2022-09-08

## 2023-10-02 RX ORDER — OMEPRAZOLE 20 MG/1
CAPSULE, DELAYED RELEASE ORAL
COMMUNITY
End: 2023-11-23 | Stop reason: WASHOUT

## 2023-10-02 RX ORDER — MAGNESIUM HYDROXIDE 400 MG/5ML
SUSPENSION, ORAL (FINAL DOSE FORM) ORAL
COMMUNITY
End: 2023-11-21 | Stop reason: ALTCHOICE

## 2023-10-02 RX ORDER — SERTRALINE HYDROCHLORIDE 50 MG/1
TABLET, FILM COATED ORAL
COMMUNITY
Start: 2022-07-05 | End: 2023-10-04 | Stop reason: ENTERED-IN-ERROR

## 2023-10-02 RX ORDER — VENLAFAXINE HYDROCHLORIDE 75 MG/1
1 CAPSULE, EXTENDED RELEASE ORAL DAILY
COMMUNITY
Start: 2023-09-05 | End: 2023-10-04 | Stop reason: SDUPTHER

## 2023-10-02 RX ORDER — ERGOCALCIFEROL 1.25 MG/1
1.25 CAPSULE ORAL
COMMUNITY
End: 2023-11-21 | Stop reason: ALTCHOICE

## 2023-10-02 RX ORDER — ROPINIROLE 0.5 MG/1
TABLET, FILM COATED ORAL
COMMUNITY
Start: 2022-10-25 | End: 2023-11-21 | Stop reason: ALTCHOICE

## 2023-10-02 RX ORDER — CLONAZEPAM 0.12 MG/1
0.12 TABLET, ORALLY DISINTEGRATING ORAL 3 TIMES DAILY PRN
COMMUNITY
End: 2023-10-04 | Stop reason: ENTERED-IN-ERROR

## 2023-10-02 RX ORDER — TRIAMCINOLONE ACETONIDE 55 UG/1
SPRAY, METERED NASAL
COMMUNITY
End: 2023-11-21 | Stop reason: ALTCHOICE

## 2023-10-02 RX ORDER — ESCITALOPRAM OXALATE 20 MG/1
1 TABLET ORAL DAILY
COMMUNITY
Start: 2018-06-14 | End: 2023-10-04 | Stop reason: ENTERED-IN-ERROR

## 2023-10-04 ENCOUNTER — TELEMEDICINE (OUTPATIENT)
Dept: BEHAVIORAL HEALTH | Facility: CLINIC | Age: 48
End: 2023-10-04
Payer: COMMERCIAL

## 2023-10-04 DIAGNOSIS — F42.2 MIXED OBSESSIONAL THOUGHTS AND ACTS: ICD-10-CM

## 2023-10-04 PROCEDURE — 99214 OFFICE O/P EST MOD 30 MIN: CPT | Performed by: STUDENT IN AN ORGANIZED HEALTH CARE EDUCATION/TRAINING PROGRAM

## 2023-10-04 RX ORDER — VENLAFAXINE HYDROCHLORIDE 75 MG/1
75 CAPSULE, EXTENDED RELEASE ORAL DAILY
Qty: 30 CAPSULE | Refills: 1 | Status: SHIPPED | OUTPATIENT
Start: 2023-10-04 | End: 2023-11-09 | Stop reason: SDUPTHER

## 2023-10-04 NOTE — PATIENT INSTRUCTIONS
- continue Effexor at the current dose  - continue Buspar as prescribed  - call to schedule therapist when ready

## 2023-10-04 NOTE — PROGRESS NOTES
"Outpatient Psychiatry Follow-Up    Subjective   James Brambila is a 48 y.o. male with trauma-triggered OCD and unspecified binge eating as well as class 3 obesity with newly diagnosed DM2 (A1c 13.2 8/2023) on GLP1 and metformin, familial HLD, moderate persistent asthma, MARITZA with a history of excessive daytime sleepiness, chronic low back pain with sciatica (completed PT at [facility] 12/22).    OCD symptoms: Fears of being hacked online, with frequent checking of bank accounts, hypervigilance about email and online safety.    Previous Treatment Plan  Last time, we discussed trauma-triggered OCD according to the diagnoses of PTSD and OCD. We decided on a plan to optimize treatment for this diagnosis combination, using medication (Venlafaxine) and therapy (Exposure and response prevention). We stopped trazodone, which didn't seem to be helping, but continued buspirone for now. Pending the start of therapy, and to help support it, I recommend an evidence-based antoni called \"Unwinding Anxiety,\" available in any antoni store. Since then, he reported positive changes with the initial dose of venlafaxine.    - ERP referral  - titrate venlafazine  - continue buspar  - stop trazodone  - Unwinding Anxiety    Interim History  Updates: Family member in the ICU, not looking good, hard on everyone. He's not looking forward to what will eventually more. Gets uncomfortable going to visit in the hospital because he doesn't know what to say.   Therapist: got the message but hasn't called yet, has been meaning to but not making time. Does want to get this started by the end of the year.  Effexor: Feels like it's working. The past week or so, he feels a lot less of the physical stress, and he's having an easier time letting go of obsessive thoughts, and they arise less frequently. Still checking his phone, but not with tension and anxiety or as frequently. Has less desire to buy things to calm down his anxiety, as anxiety is less. Also surprised at " how well he's holding himself together with family stressor. AE: Getting warm occasionally, sweating sometimes. Maybe gets colder easier too - really more temperature-sensitive all around. Some mild BM changes that aren't troublesome now, but a week or so ago was feeling sick and spent an hour in the bathroom. However, reflux seems less since stopping trazodone.  UA: Didn't look at it, but he's also trying to be on his phone less. Reading more books instead.  DM: Going well, checking his sugars a few times a day and levels are generally better and he's learning about it.  Sleepiness: Hard to tell if there's any change.   Mood overall improved, less angry/cynical, more open to doing things. Has been noticing more, though, the times when he doesn't want to talk or engage with people.    Psychiatric Medications  Venlafaxine XR 75 mg daily  Buspirone 10 mg BID    Objective   Mental Status Exam:  General Appearance: Well groomed, appropriate eye contact  Attitude/Behavior: Cooperative  Motor: No psychomotor agitation or retardation, no tremor or other abnormal movements  Speech: Normal rate, volume, prosody  Mood: improving  Affect: Euthymic, full-range  Thought Process: Linear, goal directed  Thought Associations: No loosening of associations  Thought Content: Normal  Perception: No perceptual abnormalities noted  Sensorium: Alert  Insight: Intact  Judgement: Intact  Cognition: Cognitively intact to conversational testing with respect to attention, orientation, fund of knowledge, recent and remote memory, and language    Lab Review:   not applicable    Assessment/Plan   Assessment  48 y.o. male with trauma-triggered OCD and unspecified binge eating as well as class 3 obesity with newly diagnosed DM2 (A1c 13.2 8/2023) on GLP1 and metformin, familial HLD, moderate persistent asthma, MARITZA with a history of excessive daytime sleepiness, chronic low back pain with sciatica (completed PT at [facility] 12/22).    Plan  - continue  Effexor at the current dose  - continue Buspar as prescribed  - call to schedule therapist when ready  - supportive psychotherapy provided  - consider venlafaxine increase next time  - discuss desires to not interact next time    Diagnoses and all orders for this visit:  Mixed obsessional thoughts and acts  -     venlafaxine XR (Effexor-XR) 75 mg 24 hr capsule; Take 1 capsule (75 mg) by mouth once daily. With food      Suicide Risk Assessment  The patient has few risk factors for suicide and does not endorse worsening depression; they are therefore at low risk for suicide and appropriate for continued outpatient follow-up.    I provided the mobile crisis number and encouraged calling this, 988 or 911 in case of a mental health crisis, including feeling suicidal or losing touch with reality.

## 2023-11-08 ENCOUNTER — TELEMEDICINE (OUTPATIENT)
Dept: BEHAVIORAL HEALTH | Facility: CLINIC | Age: 48
End: 2023-11-08
Payer: COMMERCIAL

## 2023-11-08 DIAGNOSIS — F40.11 GENERALIZED SOCIAL PHOBIA: ICD-10-CM

## 2023-11-08 DIAGNOSIS — F42.2 MIXED OBSESSIONAL THOUGHTS AND ACTS: ICD-10-CM

## 2023-11-08 DIAGNOSIS — F43.10 PTSD (POST-TRAUMATIC STRESS DISORDER): ICD-10-CM

## 2023-11-08 DIAGNOSIS — F41.9 ANXIETY: ICD-10-CM

## 2023-11-08 DIAGNOSIS — F41.8 DEPRESSION WITH ANXIETY: ICD-10-CM

## 2023-11-08 PROBLEM — F42.9 OBSESSIVE COMPULSIVE DISORDER: Status: ACTIVE | Noted: 2021-04-13

## 2023-11-08 PROCEDURE — 99214 OFFICE O/P EST MOD 30 MIN: CPT | Performed by: STUDENT IN AN ORGANIZED HEALTH CARE EDUCATION/TRAINING PROGRAM

## 2023-11-08 ASSESSMENT — ENCOUNTER SYMPTOMS
TREMORS: 1
BLACKOUTS: 0
POLYPHAGIA: 1
BLURRED VISION: 0
CONFUSION: 0
POLYDIPSIA: 0
SEIZURES: 0
FATIGUE: 0
WEAKNESS: 0
VISUAL CHANGE: 0
WEIGHT LOSS: 0
DIZZINESS: 0
SWEATS: 1
HEADACHES: 0
HUNGER: 1
NERVOUS/ANXIOUS: 1
SPEECH DIFFICULTY: 0

## 2023-11-08 NOTE — PROGRESS NOTES
Outpatient Psychiatry Follow-Up    Subjective   James Brambila is a 48 y.o. male with trauma-triggered OCD and unspecified binge eating as well as class 3 obesity with newly diagnosed DM2 (A1c 13.2 8/2023) on GLP1 and metformin, familial HLD, moderate persistent asthma, MARITZA with a history of excessive daytime sleepiness, chronic low back pain with sciatica (completed PT at [facility] 12/22).     Previous plan:   - call to schedule therapist when ready  - consider venlafaxine increase next time  - discuss desires to not interact next time    Interim History    Things generally going okay since last time.  Checking: Not checking anything based on fear, just doing daily as part of routine. Will go on credit card antoni to track what he's spending, feels like he's trying to give himself reasons to do it.  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 In terms of other escapes, still watching movies, reading a lot more, listening to music while he works.   Feeling nervous constantly: Not having that anymore, feeling more normal. Will still get easily annoyed at things, but not worrying in general.  Fun to be around: Hasn't really changed. Still some social anxiety, doesn't want to make a fool of himself, talk too loud, sometimes people can't hear him. This can lead to anger.  Supporting sick family member: He did go visit her, wasn't a wreck, she ended up dying two days later. He acknowledges he was able to be supportive the way he wanted.   Want to change: Still having low-level anxiety, triggered by noises outside, having irritability.  Therapist: Forgot to get set up with anyone, not set up yet.     Psychiatric Medications  Venlafaxine XR 75 mg daily  Buspirone 10 mg BID    Objective   Mental Status Exam:  General Appearance: Well groomed, appropriate eye contact  Attitude/Behavior: Cooperative  Motor: No psychomotor agitation or  retardation, no tremor or other abnormal movements  Speech: Normal rate, volume, prosody  Mood: fine  Affect: Constricted  Thought Process: Linear, goal directed  Thought Associations: No loosening of associations  Thought Content: Normal  Perception: No perceptual abnormalities noted  Sensorium: Alert  Insight: Intact  Judgement: Intact  Cognition: Cognitively intact to conversational testing with respect to attention, orientation, fund of knowledge, recent and remote memory, and language    Lab Review:   not applicable    Assessment/Plan     Problem List Items Addressed This Visit             ICD-10-CM    Obsessive compulsive disorder F42.9     Improved significantly with venlafaxine, but still having desire to engage in compulsions simply as an enjoyable activity, not to escape obsessive thoughts.  - education on pleasure-pain homeostasis         Relevant Medications    venlafaxine XR (Effexor-XR) 150 mg 24 hr capsule    Other Relevant Orders    Follow Up In Psychiatry    PTSD (post-traumatic stress disorder) F43.10     Significant improvement with venlafaxine; physical anxiety/tension resolved. However, still having some irritability, hypervigilance, social phobia.  - titrate venlafaxine         Relevant Medications    venlafaxine XR (Effexor-XR) 150 mg 24 hr capsule    Other Relevant Orders    Follow Up In Psychiatry    Generalized social phobia F40.11    Relevant Orders    Referral to Psychology     Other Visit Diagnoses         Codes    Depression with anxiety     F41.8    Relevant Medications    busPIRone (Buspar) 10 mg tablet    Anxiety     F41.9    Relevant Medications    busPIRone (Buspar) 10 mg tablet          [employer] podcast  Venlafaxine to 150  Exposure therapy for social anxiety    Suicide Risk Assessment  The patient has few risk factors for suicide and does not endorse worsening depression; they are therefore at low risk for suicide and appropriate for continued outpatient follow-up.    I provided  the mobile crisis number and encouraged calling this, 988 or 911 in case of a mental health crisis, including feeling suicidal or losing touch with reality.

## 2023-11-09 PROBLEM — F40.11 GENERALIZED SOCIAL PHOBIA: Status: ACTIVE | Noted: 2023-11-09

## 2023-11-09 RX ORDER — BUSPIRONE HYDROCHLORIDE 10 MG/1
10 TABLET ORAL 2 TIMES DAILY
Qty: 60 TABLET | Refills: 2 | Status: SHIPPED | OUTPATIENT
Start: 2023-11-09 | End: 2023-11-18 | Stop reason: SDUPTHER

## 2023-11-09 RX ORDER — VENLAFAXINE HYDROCHLORIDE 150 MG/1
150 CAPSULE, EXTENDED RELEASE ORAL DAILY
Qty: 30 CAPSULE | Refills: 2 | Status: SHIPPED | OUTPATIENT
Start: 2023-11-09 | End: 2024-01-22 | Stop reason: SDUPTHER

## 2023-11-10 NOTE — ASSESSMENT & PLAN NOTE
Improved significantly with venlafaxine, but still having desire to engage in compulsions simply as an enjoyable activity, not to escape obsessive thoughts.  - education on pleasure-pain homeostasis

## 2023-11-10 NOTE — ASSESSMENT & PLAN NOTE
Significant improvement with venlafaxine; physical anxiety/tension resolved. However, still having some irritability, hypervigilance, social phobia.  - titrate venlafaxine

## 2023-11-10 NOTE — PATIENT INSTRUCTIONS
"- consider a 2-week \"holiday\" from CD collecting  - increase venlafaxine to 150 mg daily    Faith Mijares interview on craving and the brain's pain-pleasure balance:  [email or web address]  [email or web address]    Please send me a message in Rounds if things aren't going as expected or you are unsure about something we discussed. If this isn't working, you can call the psychiatry department line at [phone], or leave me a voicemail at [phone].  If you are having thoughts of harming yourself or ending your life, please call the national suicide hotline 24/7 at 466. For this and other mental health emergencies, such as losing touch with reality, call the [facility] 24/7 mobile crisis hotline at 498-093-1959, or dial 911 for emergency services.    "

## 2023-11-13 ENCOUNTER — LAB (OUTPATIENT)
Dept: LAB | Facility: LAB | Age: 48
End: 2023-11-13
Payer: COMMERCIAL

## 2023-11-13 DIAGNOSIS — E11.9 TYPE 2 DIABETES MELLITUS WITHOUT COMPLICATION, WITHOUT LONG-TERM CURRENT USE OF INSULIN (MULTI): ICD-10-CM

## 2023-11-13 LAB
ANION GAP SERPL CALC-SCNC: 10 MMOL/L (ref 10–20)
BUN SERPL-MCNC: 12 MG/DL (ref 6–23)
CALCIUM SERPL-MCNC: 9.2 MG/DL (ref 8.6–10.6)
CHLORIDE SERPL-SCNC: 104 MMOL/L (ref 98–107)
CO2 SERPL-SCNC: 29 MMOL/L (ref 21–32)
CREAT SERPL-MCNC: 0.92 MG/DL (ref 0.5–1.3)
CREAT UR-MCNC: 169.3 MG/DL (ref 20–370)
EST. AVERAGE GLUCOSE BLD GHB EST-MCNC: 120 MG/DL
GFR SERPL CREATININE-BSD FRML MDRD: >90 ML/MIN/1.73M*2
GLUCOSE SERPL-MCNC: 133 MG/DL (ref 74–99)
HBA1C MFR BLD: 5.8 %
MICROALBUMIN UR-MCNC: 9.4 MG/L
MICROALBUMIN/CREAT UR: 5.6 UG/MG CREAT
POTASSIUM SERPL-SCNC: 4.3 MMOL/L (ref 3.5–5.3)
SODIUM SERPL-SCNC: 139 MMOL/L (ref 136–145)

## 2023-11-13 PROCEDURE — 82570 ASSAY OF URINE CREATININE: CPT

## 2023-11-13 PROCEDURE — 80048 BASIC METABOLIC PNL TOTAL CA: CPT

## 2023-11-13 PROCEDURE — 82043 UR ALBUMIN QUANTITATIVE: CPT

## 2023-11-13 PROCEDURE — 83036 HEMOGLOBIN GLYCOSYLATED A1C: CPT

## 2023-11-13 PROCEDURE — 36415 COLL VENOUS BLD VENIPUNCTURE: CPT

## 2023-11-15 ENCOUNTER — APPOINTMENT (OUTPATIENT)
Dept: PRIMARY CARE | Facility: CLINIC | Age: 48
End: 2023-11-15
Payer: COMMERCIAL

## 2023-11-15 ASSESSMENT — ENCOUNTER SYMPTOMS
HUNGER: 1
HEADACHES: 0
BLACKOUTS: 0
SWEATS: 1
SPEECH DIFFICULTY: 0
VISUAL CHANGE: 0
DIZZINESS: 0
BLURRED VISION: 0
WEIGHT LOSS: 0
NERVOUS/ANXIOUS: 1
SEIZURES: 0
POLYPHAGIA: 1
TREMORS: 1
CONFUSION: 0
POLYDIPSIA: 0
FATIGUE: 0
WEAKNESS: 0

## 2023-11-18 DIAGNOSIS — F41.8 DEPRESSION WITH ANXIETY: ICD-10-CM

## 2023-11-18 DIAGNOSIS — F41.9 ANXIETY: ICD-10-CM

## 2023-11-18 RX ORDER — BUSPIRONE HYDROCHLORIDE 10 MG/1
10 TABLET ORAL 2 TIMES DAILY
Qty: 180 TABLET | Refills: 1 | Status: SHIPPED | OUTPATIENT
Start: 2023-11-18 | End: 2024-05-18

## 2023-11-21 ENCOUNTER — OFFICE VISIT (OUTPATIENT)
Dept: PRIMARY CARE | Facility: CLINIC | Age: 48
End: 2023-11-21
Payer: COMMERCIAL

## 2023-11-21 VITALS
HEART RATE: 87 BPM | BODY MASS INDEX: 42.42 KG/M2 | DIASTOLIC BLOOD PRESSURE: 84 MMHG | HEIGHT: 71 IN | OXYGEN SATURATION: 98 % | WEIGHT: 303 LBS | SYSTOLIC BLOOD PRESSURE: 148 MMHG

## 2023-11-21 DIAGNOSIS — F43.10 PTSD (POST-TRAUMATIC STRESS DISORDER): ICD-10-CM

## 2023-11-21 DIAGNOSIS — F42.2 MIXED OBSESSIONAL THOUGHTS AND ACTS: ICD-10-CM

## 2023-11-21 DIAGNOSIS — E11.9 TYPE 2 DIABETES MELLITUS WITHOUT COMPLICATION, WITHOUT LONG-TERM CURRENT USE OF INSULIN (MULTI): ICD-10-CM

## 2023-11-21 DIAGNOSIS — M54.50 CHRONIC BILATERAL LOW BACK PAIN WITHOUT SCIATICA: ICD-10-CM

## 2023-11-21 DIAGNOSIS — I10 HYPERTENSION, ESSENTIAL: ICD-10-CM

## 2023-11-21 DIAGNOSIS — G47.33 OSA ON CPAP: ICD-10-CM

## 2023-11-21 DIAGNOSIS — G89.29 CHRONIC BILATERAL LOW BACK PAIN WITHOUT SCIATICA: ICD-10-CM

## 2023-11-21 DIAGNOSIS — J45.909 ASTHMA, UNSPECIFIED ASTHMA SEVERITY, UNSPECIFIED WHETHER COMPLICATED, UNSPECIFIED WHETHER PERSISTENT (HHS-HCC): ICD-10-CM

## 2023-11-21 DIAGNOSIS — Z13.6 SCREENING FOR HEART DISEASE: ICD-10-CM

## 2023-11-21 DIAGNOSIS — E78.49 HYPERLIPIDEMIA, FAMILIAL, HIGH LDL: ICD-10-CM

## 2023-11-21 DIAGNOSIS — E66.01 OBESITY, MORBID, BMI 40.0-49.9 (MULTI): Primary | ICD-10-CM

## 2023-11-21 PROCEDURE — 3044F HG A1C LEVEL LT 7.0%: CPT | Performed by: INTERNAL MEDICINE

## 2023-11-21 PROCEDURE — 3077F SYST BP >= 140 MM HG: CPT | Performed by: INTERNAL MEDICINE

## 2023-11-21 PROCEDURE — 99214 OFFICE O/P EST MOD 30 MIN: CPT | Performed by: INTERNAL MEDICINE

## 2023-11-21 PROCEDURE — 1036F TOBACCO NON-USER: CPT | Performed by: INTERNAL MEDICINE

## 2023-11-21 PROCEDURE — 3061F NEG MICROALBUMINURIA REV: CPT | Performed by: INTERNAL MEDICINE

## 2023-11-21 PROCEDURE — 4010F ACE/ARB THERAPY RXD/TAKEN: CPT | Performed by: INTERNAL MEDICINE

## 2023-11-21 PROCEDURE — 3079F DIAST BP 80-89 MM HG: CPT | Performed by: INTERNAL MEDICINE

## 2023-11-21 RX ORDER — TIZANIDINE HYDROCHLORIDE 2 MG/1
2 CAPSULE, GELATIN COATED ORAL 2 TIMES DAILY PRN
Qty: 60 CAPSULE | Refills: 1 | Status: SHIPPED | OUTPATIENT
Start: 2023-11-21 | End: 2023-11-21

## 2023-11-21 RX ORDER — TIZANIDINE 2 MG/1
2 TABLET ORAL 2 TIMES DAILY PRN
Qty: 60 TABLET | Refills: 1 | Status: SHIPPED | OUTPATIENT
Start: 2023-11-21 | End: 2023-12-13

## 2023-11-21 ASSESSMENT — PATIENT HEALTH QUESTIONNAIRE - PHQ9
SUM OF ALL RESPONSES TO PHQ9 QUESTIONS 1 AND 2: 0
2. FEELING DOWN, DEPRESSED OR HOPELESS: NOT AT ALL
1. LITTLE INTEREST OR PLEASURE IN DOING THINGS: NOT AT ALL

## 2023-11-21 ASSESSMENT — PAIN SCALES - GENERAL: PAINLEVEL: 2

## 2023-11-21 ASSESSMENT — COLUMBIA-SUICIDE SEVERITY RATING SCALE - C-SSRS: 1. IN THE PAST MONTH, HAVE YOU WISHED YOU WERE DEAD OR WISHED YOU COULD GO TO SLEEP AND NOT WAKE UP?: NO

## 2023-11-21 NOTE — PROGRESS NOTES
"Subjective   Patient ID: James Brambila is a 48 y.o. male who presents for Follow-up (3 month ) and Back Pain (X 2 mo ).    HPI patient presents to clinic for follow-up visit on  history of depression/anxiety,morbid obesity,ptsd,asthma,anni on cpap,and Hypertension .  He has been complaining of some low back pain due to muscle spasm.  He was recently seen by Dr. Ramirez regarding PTSD and obsessive-compulsive disorder and has been taking venlafaxine.  He is otherwise doing well and denies any radiation of pain to lower extremity, bladder bowel incontinence, dysuria, nausea vomiting and fever.  Laboratory studies done shows hemoglobin A1c of 5.8, serum glucose of 133 and other studies are unremarkable.,         Review of Systems   Constitutional: Negative.    HENT: Negative.     Eyes: Negative.    Respiratory: Negative.     Cardiovascular: Negative.    Gastrointestinal: Negative.    Endocrine: Negative.    Genitourinary: Negative.    Musculoskeletal:  Positive for back pain.   Skin: Negative.    Allergic/Immunologic: Negative.    Neurological: Negative.    Hematological: Negative.    Psychiatric/Behavioral: Negative.         Objective   /84   Pulse 87   Ht 1.803 m (5' 11\")   Wt 137 kg (303 lb)   SpO2 98%   BMI 42.26 kg/m²     Physical Exam  Constitutional:       Appearance: Normal appearance. He is obese.   HENT:      Right Ear: Tympanic membrane normal.      Left Ear: Tympanic membrane and ear canal normal.      Nose: Nose normal.   Neck:      Vascular: No carotid bruit.   Cardiovascular:      Rate and Rhythm: Normal rate.   Pulmonary:      Effort: No respiratory distress.      Breath sounds: No stridor. No wheezing.   Abdominal:      Palpations: Abdomen is soft.      Tenderness: There is no abdominal tenderness. There is no guarding or rebound.   Skin:     Coloration: Skin is not jaundiced.   Neurological:      General: No focal deficit present.      Mental Status: He is alert and oriented to person, place, " and time.   Psychiatric:         Mood and Affect: Mood normal.         Assessment/Plan    patient is encouraged to diet, exercise and do lifestyle modification for obesity.  He is counseled and educated about risk of hypoglycemia.  He is advised to increase Ozempic to 0.5 mg weekly and decrease metformin to 1000 mg once a day.  Will be scheduled for CT cardiac scoring regarding screening for heart disease.  He is given trial with Zanaflex regarding muscle spasm of his lower back.  He will continue other medications and will return to clinic in 3 months for follow-up visit.

## 2023-11-21 NOTE — PROGRESS NOTES
"Subjective   Patient ID: James Brambila is a 48 y.o. male who presents for Follow-up (3 month ) and Back Pain (X 2 mo ).    HPI     Review of Systems    Objective   /84   Pulse 87   Ht 1.803 m (5' 11\")   Wt 137 kg (303 lb)   SpO2 98%   BMI 42.26 kg/m²     Physical Exam    Assessment/Plan          "

## 2023-11-23 ASSESSMENT — ENCOUNTER SYMPTOMS
GASTROINTESTINAL NEGATIVE: 1
ENDOCRINE NEGATIVE: 1
EYES NEGATIVE: 1
CARDIOVASCULAR NEGATIVE: 1
RESPIRATORY NEGATIVE: 1
CONSTITUTIONAL NEGATIVE: 1
HEMATOLOGIC/LYMPHATIC NEGATIVE: 1
PSYCHIATRIC NEGATIVE: 1
ALLERGIC/IMMUNOLOGIC NEGATIVE: 1
NEUROLOGICAL NEGATIVE: 1
BACK PAIN: 1

## 2023-11-28 ENCOUNTER — APPOINTMENT (OUTPATIENT)
Dept: PRIMARY CARE | Facility: CLINIC | Age: 48
End: 2023-11-28
Payer: COMMERCIAL

## 2023-12-02 DIAGNOSIS — E11.9 TYPE 2 DIABETES MELLITUS WITHOUT COMPLICATION, WITHOUT LONG-TERM CURRENT USE OF INSULIN (MULTI): ICD-10-CM

## 2023-12-02 RX ORDER — SEMAGLUTIDE 0.68 MG/ML
0.25 INJECTION, SOLUTION SUBCUTANEOUS
Qty: 3 ML | Refills: 2 | Status: SHIPPED | OUTPATIENT
Start: 2023-12-02 | End: 2024-02-21 | Stop reason: SDUPTHER

## 2023-12-13 DIAGNOSIS — G89.29 CHRONIC BILATERAL LOW BACK PAIN WITHOUT SCIATICA: ICD-10-CM

## 2023-12-13 DIAGNOSIS — M54.50 CHRONIC BILATERAL LOW BACK PAIN WITHOUT SCIATICA: ICD-10-CM

## 2023-12-13 RX ORDER — TIZANIDINE 2 MG/1
2 TABLET ORAL 2 TIMES DAILY PRN
Qty: 180 TABLET | Refills: 1 | Status: SHIPPED | OUTPATIENT
Start: 2023-12-13 | End: 2024-12-12

## 2024-01-04 DIAGNOSIS — E11.9 TYPE 2 DIABETES MELLITUS WITHOUT COMPLICATION, WITHOUT LONG-TERM CURRENT USE OF INSULIN (MULTI): ICD-10-CM

## 2024-01-04 RX ORDER — METFORMIN HYDROCHLORIDE 1000 MG/1
1000 TABLET ORAL
Qty: 60 TABLET | Refills: 11 | Status: SHIPPED | OUTPATIENT
Start: 2024-01-04

## 2024-01-04 RX ORDER — GLIMEPIRIDE 4 MG/1
4 TABLET ORAL 2 TIMES DAILY
Qty: 60 TABLET | Refills: 11 | Status: SHIPPED | OUTPATIENT
Start: 2024-01-04

## 2024-01-17 ENCOUNTER — TELEMEDICINE (OUTPATIENT)
Dept: BEHAVIORAL HEALTH | Facility: CLINIC | Age: 49
End: 2024-01-17
Payer: COMMERCIAL

## 2024-01-17 DIAGNOSIS — F42.2 MIXED OBSESSIONAL THOUGHTS AND ACTS: ICD-10-CM

## 2024-01-17 DIAGNOSIS — F43.10 PTSD (POST-TRAUMATIC STRESS DISORDER): ICD-10-CM

## 2024-01-17 DIAGNOSIS — F40.11 GENERALIZED SOCIAL PHOBIA: ICD-10-CM

## 2024-01-17 PROCEDURE — 99214 OFFICE O/P EST MOD 30 MIN: CPT | Performed by: STUDENT IN AN ORGANIZED HEALTH CARE EDUCATION/TRAINING PROGRAM

## 2024-01-17 NOTE — PROGRESS NOTES
"Outpatient Psychiatry Follow-Up    Subjective   Alvino Brambila \"James\" is a 48 y.o. male with trauma-triggered OCD and unspecified binge eating as well as class 3 obesity with newly diagnosed DM2 (A1c 13.2 8/2023) on GLP1 and metformin, familial HLD, moderate persistent asthma, MARITZA with a history of excessive daytime sleepiness, chronic low back pain with sciatica (completed PT at [facility] 12/22).     Previous Treatment Plan          ICD-10-CM      Obsessive compulsive disorder F42.9       Improved significantly with venlafaxine, but still having desire to engage in compulsions simply as an enjoyable activity, not to escape obsessive thoughts.  - education on pleasure-pain homeostasis           Relevant Medications     venlafaxine XR (Effexor-XR) 150 mg 24 hr capsule     Other Relevant Orders     Follow Up In Psychiatry     PTSD (post-traumatic stress disorder) F43.10       Significant improvement with venlafaxine; physical anxiety/tension resolved. However, still having some irritability, hypervigilance, social phobia.  - titrate venlafaxine           Relevant Medications     venlafaxine XR (Effexor-XR) 150 mg 24 hr capsule     Other Relevant Orders     Follow Up In Psychiatry     Generalized social phobia F40.11     Relevant Orders     Referral to Psychology      Other Visit Diagnoses           Codes     Depression with anxiety     F41.8     Relevant Medications     busPIRone (Buspar) 10 mg tablet       Interim History  Doesn't seem like the higher dose of medication is helping anything, but he's getting hot more easily and gaining weight. Curious about PRN for anxiety (pit of stomach sensation). He's been less motivated, more distant since going up on the med. More tired.  Therapy: Hasn't done any - feels like there's a mental block or something - the idea of having to talk about things is causing avoidance  Pit of the stomach anxiety: Usually triggered by seemingly small stressors - can't remember exactly - something at " work. Less than when he's on medication. Maybe once every couple of weeks. Can last for a few hours.   Compulsive escape: Hasn't made any intentional changes, trying not to buy things like he used to, but no concerns about his mood.  Will sometimes eat more sweets at night, also eating less during the day.  Mood: Fine  Goals: after a couple months of stability, wants to get therapy started to make the next steps.    Psychiatric Medications  Venlafaxine  mg daily  Buspirone 10 mg BID    Objective   Mental Status Exam:  General Appearance: Well groomed, appropriate eye contact  Attitude/Behavior: Cooperative  Motor: No psychomotor agitation or retardation, no tremor or other abnormal movements  Speech: Normal rate, volume, prosody  Mood: fine  Affect: Euthymic, full-range  Thought Process: Linear, goal directed  Thought Associations: No loosening of associations  Thought Content: Normal  Perception: No perceptual abnormalities noted  Sensorium: Alert  Insight: Intact  Judgement: Intact  Cognition: Cognitively intact to conversational testing with respect to attention, orientation, fund of knowledge, recent and remote memory, and language    Lab Review:   A1c 13.2 --> 5.8    Assessment/Plan     Problem List Items Addressed This Visit             ICD-10-CM    Obsessive compulsive disorder F42.9     Overall improvement but anxiety still remains. Would like to do therapy once on a fully stable medication regimen.  - decrease venlafaxine back to 75 mg daily, lowest effective dose  - for now, try hydroxyzine weekly PRN severe and functionally impairing anxiety  - practice awareness of thoughts of delaying or avoiding therapy         Relevant Medications    venlafaxine XR (Effexor-XR) 75 mg 24 hr capsule    hydrOXYzine pamoate (VistariL) 25 mg capsule    Other Relevant Orders    Follow Up In Psychiatry    PTSD (post-traumatic stress disorder) F43.10    Relevant Medications    venlafaxine XR (Effexor-XR) 75 mg 24 hr  capsule    hydrOXYzine pamoate (VistariL) 25 mg capsule    Other Relevant Orders    Follow Up In Psychiatry    Generalized social phobia F40.11    Relevant Medications    hydrOXYzine pamoate (VistariL) 25 mg capsule    Other Relevant Orders    Follow Up In Psychiatry       Next appointment with me in 2 month(s).    Suicide Risk Assessment  There are no new risk factors for suicide and imminent risk remains low; therefore, James Brambila does not require further risk mitigation.    I provided the mobile crisis number and encouraged calling this, [phone] or 911 in case of a mental health crisis, including feeling suicidal or losing touch with reality.

## 2024-01-19 ENCOUNTER — PATIENT MESSAGE (OUTPATIENT)
Dept: BEHAVIORAL HEALTH | Facility: CLINIC | Age: 49
End: 2024-01-19
Payer: COMMERCIAL

## 2024-01-22 RX ORDER — VENLAFAXINE HYDROCHLORIDE 75 MG/1
75 CAPSULE, EXTENDED RELEASE ORAL DAILY
Qty: 30 CAPSULE | Refills: 2 | Status: SHIPPED | OUTPATIENT
Start: 2024-01-22 | End: 2024-02-29 | Stop reason: SDUPTHER

## 2024-01-22 RX ORDER — HYDROXYZINE PAMOATE 25 MG/1
25 CAPSULE ORAL DAILY PRN
Qty: 5 CAPSULE | Refills: 2 | Status: SHIPPED | OUTPATIENT
Start: 2024-01-22

## 2024-01-22 NOTE — PATIENT INSTRUCTIONS
- decrease venlafaxine back to 75 mg daily, lowest effective dose  - for now, try hydroxyzine weekly PRN severe and functionally impairing anxiety  - practice awareness of thoughts of delaying or avoiding therapy    Please send me a message in [employer] if things aren't going as expected or you are unsure about something we discussed. If this isn't working, you can call the psychiatry department line at [phone], or leave me a voicemail at [phone].  If you are having thoughts of harming yourself or ending your life, please call the national suicide hotline 24/7 at 436. For this and other mental health emergencies, such as losing touch with reality, call the [facility] 24/7 mobile crisis hotline at 520-825-6671, or dial 911 for emergency services.

## 2024-01-22 NOTE — ASSESSMENT & PLAN NOTE
Overall improvement but anxiety still remains. Would like to do therapy once on a fully stable medication regimen.  - decrease venlafaxine back to 75 mg daily, lowest effective dose  - for now, try hydroxyzine weekly PRN severe and functionally impairing anxiety  - practice awareness of thoughts of delaying or avoiding therapy

## 2024-02-17 ASSESSMENT — ENCOUNTER SYMPTOMS
TREMORS: 0
WEAKNESS: 0
WEIGHT LOSS: 0
NERVOUS/ANXIOUS: 0
DIZZINESS: 0
POLYPHAGIA: 0
HUNGER: 0
SEIZURES: 0
POLYDIPSIA: 0
FATIGUE: 1
SPEECH DIFFICULTY: 0
BLACKOUTS: 0
CONFUSION: 0
HEADACHES: 0
BLURRED VISION: 0
VISUAL CHANGE: 0
SWEATS: 0

## 2024-02-20 ENCOUNTER — LAB (OUTPATIENT)
Dept: LAB | Facility: LAB | Age: 49
End: 2024-02-20
Payer: COMMERCIAL

## 2024-02-20 DIAGNOSIS — E11.9 TYPE 2 DIABETES MELLITUS WITHOUT COMPLICATION, WITHOUT LONG-TERM CURRENT USE OF INSULIN (MULTI): ICD-10-CM

## 2024-02-20 LAB
ANION GAP SERPL CALC-SCNC: 14 MMOL/L (ref 10–20)
BUN SERPL-MCNC: 14 MG/DL (ref 6–23)
CALCIUM SERPL-MCNC: 9.4 MG/DL (ref 8.6–10.6)
CHLORIDE SERPL-SCNC: 102 MMOL/L (ref 98–107)
CO2 SERPL-SCNC: 28 MMOL/L (ref 21–32)
CREAT SERPL-MCNC: 1.11 MG/DL (ref 0.5–1.3)
EGFRCR SERPLBLD CKD-EPI 2021: 83 ML/MIN/1.73M*2
EST. AVERAGE GLUCOSE BLD GHB EST-MCNC: 123 MG/DL
GLUCOSE SERPL-MCNC: 130 MG/DL (ref 74–99)
HBA1C MFR BLD: 5.9 %
POTASSIUM SERPL-SCNC: 3.9 MMOL/L (ref 3.5–5.3)
SODIUM SERPL-SCNC: 140 MMOL/L (ref 136–145)

## 2024-02-20 PROCEDURE — 36415 COLL VENOUS BLD VENIPUNCTURE: CPT

## 2024-02-20 PROCEDURE — 83036 HEMOGLOBIN GLYCOSYLATED A1C: CPT

## 2024-02-20 PROCEDURE — 80048 BASIC METABOLIC PNL TOTAL CA: CPT

## 2024-02-21 ENCOUNTER — OFFICE VISIT (OUTPATIENT)
Dept: PRIMARY CARE | Facility: CLINIC | Age: 49
End: 2024-02-21
Payer: COMMERCIAL

## 2024-02-21 VITALS
HEIGHT: 71 IN | HEART RATE: 87 BPM | WEIGHT: 315 LBS | DIASTOLIC BLOOD PRESSURE: 81 MMHG | OXYGEN SATURATION: 98 % | SYSTOLIC BLOOD PRESSURE: 135 MMHG | BODY MASS INDEX: 44.1 KG/M2

## 2024-02-21 DIAGNOSIS — Z13.6 SCREENING FOR HEART DISEASE: ICD-10-CM

## 2024-02-21 DIAGNOSIS — F43.10 PTSD (POST-TRAUMATIC STRESS DISORDER): ICD-10-CM

## 2024-02-21 DIAGNOSIS — M54.50 CHRONIC BILATERAL LOW BACK PAIN WITHOUT SCIATICA: ICD-10-CM

## 2024-02-21 DIAGNOSIS — G89.29 CHRONIC BILATERAL LOW BACK PAIN WITHOUT SCIATICA: ICD-10-CM

## 2024-02-21 DIAGNOSIS — E11.9 TYPE 2 DIABETES MELLITUS WITHOUT COMPLICATION, WITHOUT LONG-TERM CURRENT USE OF INSULIN (MULTI): ICD-10-CM

## 2024-02-21 DIAGNOSIS — F42.2 MIXED OBSESSIONAL THOUGHTS AND ACTS: ICD-10-CM

## 2024-02-21 DIAGNOSIS — E66.01 OBESITY, MORBID, BMI 40.0-49.9 (MULTI): Primary | ICD-10-CM

## 2024-02-21 DIAGNOSIS — J45.909 ASTHMA, UNSPECIFIED ASTHMA SEVERITY, UNSPECIFIED WHETHER COMPLICATED, UNSPECIFIED WHETHER PERSISTENT (HHS-HCC): ICD-10-CM

## 2024-02-21 PROCEDURE — 3079F DIAST BP 80-89 MM HG: CPT | Performed by: INTERNAL MEDICINE

## 2024-02-21 PROCEDURE — 99214 OFFICE O/P EST MOD 30 MIN: CPT | Performed by: INTERNAL MEDICINE

## 2024-02-21 PROCEDURE — 4010F ACE/ARB THERAPY RXD/TAKEN: CPT | Performed by: INTERNAL MEDICINE

## 2024-02-21 PROCEDURE — 1036F TOBACCO NON-USER: CPT | Performed by: INTERNAL MEDICINE

## 2024-02-21 PROCEDURE — 3044F HG A1C LEVEL LT 7.0%: CPT | Performed by: INTERNAL MEDICINE

## 2024-02-21 PROCEDURE — 3075F SYST BP GE 130 - 139MM HG: CPT | Performed by: INTERNAL MEDICINE

## 2024-02-21 RX ORDER — SEMAGLUTIDE 0.68 MG/ML
0.25 INJECTION, SOLUTION SUBCUTANEOUS
Qty: 12 ML | Refills: 2 | Status: SHIPPED | OUTPATIENT
Start: 2024-02-21

## 2024-02-21 RX ORDER — LIDOCAINE 50 MG/G
1 PATCH TOPICAL DAILY
Qty: 90 PATCH | Refills: 1 | Status: SHIPPED | OUTPATIENT
Start: 2024-02-21 | End: 2025-02-20

## 2024-02-21 ASSESSMENT — PATIENT HEALTH QUESTIONNAIRE - PHQ9
2. FEELING DOWN, DEPRESSED OR HOPELESS: NOT AT ALL
SUM OF ALL RESPONSES TO PHQ9 QUESTIONS 1 AND 2: 0
1. LITTLE INTEREST OR PLEASURE IN DOING THINGS: NOT AT ALL

## 2024-02-21 ASSESSMENT — PAIN SCALES - GENERAL: PAINLEVEL: 4

## 2024-02-21 ASSESSMENT — COLUMBIA-SUICIDE SEVERITY RATING SCALE - C-SSRS: 1. IN THE PAST MONTH, HAVE YOU WISHED YOU WERE DEAD OR WISHED YOU COULD GO TO SLEEP AND NOT WAKE UP?: NO

## 2024-02-21 NOTE — PROGRESS NOTES
"Subjective   Patient ID: James Brambila is a 48 y.o. male who presents for Obesity and Back Pain.    HPI     Review of Systems    Objective   /81   Pulse 87   Ht 1.803 m (5' 11\")   Wt 146 kg (322 lb)   SpO2 98%   BMI 44.91 kg/m²     Physical Exam    Assessment/Plan          "

## 2024-02-21 NOTE — PROGRESS NOTES
Subjective   Patient ID: James Brambila is a 48 y.o. male who presents for Obesity and Back Pain.    Diabetes  He has type 2 diabetes mellitus. No MedicAlert identification noted. The initial diagnosis of diabetes was made 6 months ago. Hypoglycemia symptoms include sleepiness. Pertinent negatives for hypoglycemia include no confusion, dizziness, headaches, hunger, mood changes, nervousness/anxiousness, pallor, seizures, speech difficulty, sweats or tremors. Associated symptoms include fatigue. Pertinent negatives for diabetes include no blurred vision, no chest pain, no foot paresthesias, no foot ulcerations, no polydipsia, no polyphagia, no polyuria, no visual change, no weakness and no weight loss. Pertinent negatives for hypoglycemia complications include no blackouts, no hospitalization, no nocturnal hypoglycemia, no required assistance and no required glucagon injection. Symptoms are stable. Pertinent negatives for diabetic complications include no CVA, heart disease, impotence, nephropathy, peripheral neuropathy, PVD or retinopathy. Risk factors for coronary artery disease include family history, hypertension, obesity and sedentary lifestyle. Current diabetic treatment includes diet and oral agent (triple therapy). He is compliant with treatment most of the time. His weight is increasing rapidly. He is following a diabetic diet. Meal planning includes avoidance of concentrated sweets. He has not had a previous visit with a dietitian. He rarely participates in exercise. He monitors blood glucose at home 1-2 x per day. He monitors urine at home <1 x per month. Blood glucose monitoring compliance is good. His home blood glucose trend is increasing steadily. His breakfast blood glucose is taken between 7-8 am. His breakfast blood glucose range is generally 110-130 mg/dl. His dinner blood glucose is taken between 6-7 pm. His dinner blood glucose range is generally 110-130 mg/dl. His overall blood glucose range is  "110-130 mg/dl. He does not see a podiatrist.Eye exam is current.   Patient presents to clinic for follow up visitr on history of  depression/anxiety,morbid obesity,ptsd,asthma,anni on cpap,and Hypertension .  He has been complaining of some low back pain due to muscle spasmfor the past few      Review of Systems   Constitutional:  Positive for fatigue. Negative for weight loss.   HENT: Negative.     Eyes: Negative.  Negative for blurred vision.   Respiratory: Negative.     Cardiovascular: Negative.  Negative for chest pain.   Gastrointestinal: Negative.    Endocrine: Negative.  Negative for polydipsia, polyphagia and polyuria.   Genitourinary: Negative.  Negative for impotence.   Musculoskeletal: Negative.    Skin: Negative.  Negative for pallor.   Allergic/Immunologic: Negative.    Neurological: Negative.  Negative for dizziness, tremors, seizures, speech difficulty, weakness and headaches.   Hematological: Negative.    Psychiatric/Behavioral: Negative.  Negative for confusion. The patient is not nervous/anxious.        Objective   /81   Pulse 87   Ht 1.803 m (5' 11\")   Wt 146 kg (322 lb)   SpO2 98%   BMI 44.91 kg/m²     Physical Exam  Constitutional:       Appearance: Normal appearance. He is obese.   HENT:      Right Ear: Tympanic membrane normal.      Left Ear: Tympanic membrane and ear canal normal.      Nose: Nose normal.   Neck:      Vascular: No carotid bruit.   Cardiovascular:      Rate and Rhythm: Normal rate.   Pulmonary:      Effort: No respiratory distress.      Breath sounds: No stridor. No wheezing.   Abdominal:      Palpations: Abdomen is soft.      Tenderness: There is no abdominal tenderness. There is no guarding or rebound.   Skin:     Coloration: Skin is not jaundiced.      Findings: No bruising.   Neurological:      General: No focal deficit present.      Mental Status: He is alert and oriented to person, place, and time.   Psychiatric:         Mood and Affect: Mood normal. "       Assessment/Plan   Patient will be scheduled for ct cardiac scor regarding screening for heart disease.He will started on lidocaine patch for back pain.He is encouraged to diet and exercise for obesity.He will also referred to nutrition services for weight loss.mrToriHe will continue other medications and will return to clinic 6 months

## 2024-02-23 ASSESSMENT — ENCOUNTER SYMPTOMS
BLURRED VISION: 0
VISUAL CHANGE: 0
WEIGHT LOSS: 0
DIZZINESS: 0
PSYCHIATRIC NEGATIVE: 1
POLYPHAGIA: 0
SPEECH DIFFICULTY: 0
EYES NEGATIVE: 1
RESPIRATORY NEGATIVE: 1
HUNGER: 0
GASTROINTESTINAL NEGATIVE: 1
HEADACHES: 0
CONFUSION: 0
ALLERGIC/IMMUNOLOGIC NEGATIVE: 1
HEMATOLOGIC/LYMPHATIC NEGATIVE: 1
ENDOCRINE NEGATIVE: 1
TREMORS: 0
BLACKOUTS: 0
NEUROLOGICAL NEGATIVE: 1
WEAKNESS: 0
SEIZURES: 0
NERVOUS/ANXIOUS: 0
SWEATS: 0
POLYDIPSIA: 0
CARDIOVASCULAR NEGATIVE: 1
FATIGUE: 1
MUSCULOSKELETAL NEGATIVE: 1

## 2024-02-28 DIAGNOSIS — F42.2 MIXED OBSESSIONAL THOUGHTS AND ACTS: ICD-10-CM

## 2024-02-28 DIAGNOSIS — F43.10 PTSD (POST-TRAUMATIC STRESS DISORDER): ICD-10-CM

## 2024-02-29 RX ORDER — VENLAFAXINE HYDROCHLORIDE 75 MG/1
75 CAPSULE, EXTENDED RELEASE ORAL DAILY
Qty: 90 CAPSULE | Refills: 0 | Status: SHIPPED | OUTPATIENT
Start: 2024-02-29 | End: 2024-03-20 | Stop reason: SDUPTHER

## 2024-03-20 ENCOUNTER — TELEMEDICINE (OUTPATIENT)
Dept: BEHAVIORAL HEALTH | Facility: CLINIC | Age: 49
End: 2024-03-20
Payer: COMMERCIAL

## 2024-03-20 DIAGNOSIS — F43.10 PTSD (POST-TRAUMATIC STRESS DISORDER): ICD-10-CM

## 2024-03-20 DIAGNOSIS — F40.11 GENERALIZED SOCIAL PHOBIA: ICD-10-CM

## 2024-03-20 DIAGNOSIS — F42.2 MIXED OBSESSIONAL THOUGHTS AND ACTS: ICD-10-CM

## 2024-03-20 PROCEDURE — 99215 OFFICE O/P EST HI 40 MIN: CPT | Performed by: STUDENT IN AN ORGANIZED HEALTH CARE EDUCATION/TRAINING PROGRAM

## 2024-03-20 PROCEDURE — 1036F TOBACCO NON-USER: CPT | Performed by: STUDENT IN AN ORGANIZED HEALTH CARE EDUCATION/TRAINING PROGRAM

## 2024-03-20 RX ORDER — QUETIAPINE FUMARATE 50 MG/1
50 TABLET, FILM COATED ORAL NIGHTLY
Qty: 30 TABLET | Refills: 1 | Status: SHIPPED | OUTPATIENT
Start: 2024-03-20 | End: 2024-04-26 | Stop reason: WASHOUT

## 2024-03-20 RX ORDER — VENLAFAXINE HYDROCHLORIDE 37.5 MG/1
37.5 CAPSULE, EXTENDED RELEASE ORAL DAILY
Qty: 7 CAPSULE | Refills: 0 | Status: SHIPPED | OUTPATIENT
Start: 2024-03-20 | End: 2024-04-26 | Stop reason: WASHOUT

## 2024-03-20 NOTE — PROGRESS NOTES
"Outpatient Psychiatry Follow-Up    Subjective   Alvino Brambila \"James\" is a 48 y.o. male with trauma-triggered OCD and unspecified binge eating as well as BMI 45 with recently diagnosed DM2 (A1c 13.2 8/2023) on GLP1 and metformin, familial HLD, moderate persistent asthma, MARITZA with a history of excessive daytime sleepiness, chronic low back pain with sciatica (completed PT at [facility] 12/22).     Previous Treatment Plan  Obsessive compulsive disorder F42.9        Overall improvement but anxiety still remains. Would like to do therapy once on a fully stable medication regimen.  - decrease venlafaxine back to 75 mg daily, lowest effective dose  - for now, try hydroxyzine weekly PRN severe and functionally impairing anxiety  - practice awareness of thoughts of delaying or avoiding therapy           Relevant Medications     venlafaxine XR (Effexor-XR) 75 mg 24 hr capsule     hydrOXYzine pamoate (VistariL) 25 mg capsule     Other Relevant Orders     Follow Up In Psychiatry     PTSD (post-traumatic stress disorder) F43.10     Relevant Medications     venlafaxine XR (Effexor-XR) 75 mg 24 hr capsule     hydrOXYzine pamoate (VistariL) 25 mg capsule     Other Relevant Orders     Follow Up In Psychiatry     Generalized social phobia F40.11     Relevant Medications     hydrOXYzine pamoate (VistariL) 25 mg capsule     Other Relevant Orders     Follow Up In Psychiatry       Interim History    He's been less interested in things, anhedonic. Able to do things that need to get done, but hard to concentrate at work or want to go out and do stuff. Not necessarily depressed, just bored.   Has felt like this before, when he was on Lexapro, although it was worse then.   He's also gained back a lot of weight, everything he lost last year. Definitely less physically active. Not really changed his eating habits though.  Hydroxyzine: actually hasn't tried it, no more of those anxious feelings arising  Therapy avoidance: on a list, but just putting " everything off related to anhedonia    Psychiatric Medications  Venlafaxine XR 75 mg daily  Buspirone 10 mg BID  Hydroxyzine 25 mg daily PRN severe anxiety    Objective   Mental Status Exam:  General Appearance: Well groomed, appropriate eye contact  Attitude/Behavior: Cooperative  Motor: No psychomotor agitation or retardation, no tremor or other abnormal movements  Speech: Normal rate, volume, prosody  Mood: fine  Affect: Constricted  Thought Process: Linear, goal directed  Thought Associations: No loosening of associations  Thought Content: Normal  Perception: No perceptual abnormalities noted  Sensorium: Alert  Insight: Intact  Judgement: Intact  Cognition: Cognitively intact to conversational testing with respect to attention, orientation, fund of knowledge, recent and remote memory, and language    Lab Review:   Lab Results   Component Value Date     02/20/2024    K 3.9 02/20/2024     02/20/2024    CO2 28 02/20/2024    BUN 14 02/20/2024    CREATININE 1.11 02/20/2024    GLUCOSE 130 (H) 02/20/2024    CALCIUM 9.4 02/20/2024     Lab Results   Component Value Date    CHOL 164 08/04/2023    TRIG 497 (H) 08/04/2023    HDL 36.2 (A) 08/04/2023       Assessment/Plan     Problem List Items Addressed This Visit             ICD-10-CM    Obsessive compulsive disorder F42.9     Significant affective flattening with anhedonia contributing to weight gain is outweighing the benefit of reduced anxiety. Will take SSRI/SNRIs off the table for a while.  - Effexor 37.5 1 week, then stop  - start Quetiapine 50 mg nightly  - Continue buspar for now         Relevant Medications    venlafaxine XR (Effexor-XR) 37.5 mg 24 hr capsule    QUEtiapine (SEROquel) 50 mg tablet    Other Relevant Orders    Follow Up In Psychiatry    PTSD (post-traumatic stress disorder) F43.10    Generalized social phobia F40.11     - therapy once anhedonia reduced with medication change              Next appointment with me in 1 month(s).    Suicide  Risk Assessment  There are no new risk factors for suicide and imminent risk remains low; therefore, James Brambila does not require further risk mitigation.    I provided the mobile crisis number and encouraged calling this, 988 or 911 in case of a mental health crisis, including feeling suicidal or losing touch with reality.

## 2024-03-25 NOTE — PATIENT INSTRUCTIONS
- Effexor reduced to 37.5 mg daily for 1 week, then stop  - start Quetiapine 50 mg nightly, now  - Continue buspar for now    Please send me a message in [employer] if things aren't going as expected or you are unsure about something we discussed. If this isn't working, you can call the psychiatry department line at [phone], or leave me a voicemail at [phone].  If you are having thoughts of harming yourself or ending your life, please call the national suicide hotline 24/7 at 119. For this and other mental health emergencies, such as losing touch with reality, call the [facility] 24/7 mobile crisis hotline at 297-733-9282, or dial 911 for emergency services.

## 2024-03-25 NOTE — ASSESSMENT & PLAN NOTE
Significant affective flattening with anhedonia contributing to weight gain is outweighing the benefit of reduced anxiety. Will take SSRI/SNRIs off the table for a while.  - Effexor 37.5 1 week, then stop  - start Quetiapine 50 mg nightly  - Continue buspar for now

## 2024-04-08 ENCOUNTER — HOSPITAL ENCOUNTER (OUTPATIENT)
Dept: RADIOLOGY | Facility: CLINIC | Age: 49
Discharge: HOME | End: 2024-04-08
Payer: COMMERCIAL

## 2024-04-08 DIAGNOSIS — G89.29 CHRONIC LOW BACK PAIN WITHOUT SCIATICA, UNSPECIFIED BACK PAIN LATERALITY: Primary | ICD-10-CM

## 2024-04-08 DIAGNOSIS — G89.29 CHRONIC LOW BACK PAIN WITHOUT SCIATICA, UNSPECIFIED BACK PAIN LATERALITY: ICD-10-CM

## 2024-04-08 DIAGNOSIS — M54.50 CHRONIC LOW BACK PAIN WITHOUT SCIATICA, UNSPECIFIED BACK PAIN LATERALITY: Primary | ICD-10-CM

## 2024-04-08 DIAGNOSIS — M54.50 CHRONIC LOW BACK PAIN WITHOUT SCIATICA, UNSPECIFIED BACK PAIN LATERALITY: ICD-10-CM

## 2024-04-08 PROCEDURE — 72110 X-RAY EXAM L-2 SPINE 4/>VWS: CPT

## 2024-04-08 PROCEDURE — 72110 X-RAY EXAM L-2 SPINE 4/>VWS: CPT | Performed by: STUDENT IN AN ORGANIZED HEALTH CARE EDUCATION/TRAINING PROGRAM

## 2024-04-17 ENCOUNTER — HOSPITAL ENCOUNTER (OUTPATIENT)
Dept: RADIOLOGY | Facility: CLINIC | Age: 49
Discharge: HOME | End: 2024-04-17
Payer: COMMERCIAL

## 2024-04-17 DIAGNOSIS — Z13.6 SCREENING FOR HEART DISEASE: ICD-10-CM

## 2024-04-17 PROCEDURE — 75571 CT HRT W/O DYE W/CA TEST: CPT

## 2024-04-21 ASSESSMENT — ENCOUNTER SYMPTOMS
TINGLING: 0
PERIANAL NUMBNESS: 0
BACK PAIN: 1
BOWEL INCONTINENCE: 0
HEADACHES: 0
DYSURIA: 0
LEG PAIN: 1
PARESIS: 0
ABDOMINAL PAIN: 0
WEIGHT LOSS: 0
WEAKNESS: 1
FEVER: 0
NUMBNESS: 1
PARESTHESIAS: 1

## 2024-04-22 ENCOUNTER — OFFICE VISIT (OUTPATIENT)
Dept: PRIMARY CARE | Facility: CLINIC | Age: 49
End: 2024-04-22
Payer: COMMERCIAL

## 2024-04-22 VITALS
HEIGHT: 71 IN | OXYGEN SATURATION: 96 % | HEART RATE: 86 BPM | SYSTOLIC BLOOD PRESSURE: 149 MMHG | BODY MASS INDEX: 44.1 KG/M2 | DIASTOLIC BLOOD PRESSURE: 86 MMHG | WEIGHT: 315 LBS

## 2024-04-22 DIAGNOSIS — M47.816 LUMBAR SPONDYLOSIS: Primary | ICD-10-CM

## 2024-04-22 PROCEDURE — 3079F DIAST BP 80-89 MM HG: CPT | Performed by: INTERNAL MEDICINE

## 2024-04-22 PROCEDURE — 99213 OFFICE O/P EST LOW 20 MIN: CPT | Performed by: INTERNAL MEDICINE

## 2024-04-22 PROCEDURE — 3077F SYST BP >= 140 MM HG: CPT | Performed by: INTERNAL MEDICINE

## 2024-04-22 RX ORDER — DULOXETIN HYDROCHLORIDE 30 MG/1
30 CAPSULE, DELAYED RELEASE ORAL DAILY
Qty: 30 CAPSULE | Refills: 3 | Status: SHIPPED | OUTPATIENT
Start: 2024-04-22 | End: 2025-04-22

## 2024-04-22 RX ORDER — GABAPENTIN 300 MG/1
300 CAPSULE ORAL 2 TIMES DAILY
Qty: 60 CAPSULE | Refills: 3 | Status: SHIPPED | OUTPATIENT
Start: 2024-04-22 | End: 2025-04-22

## 2024-04-22 ASSESSMENT — PATIENT HEALTH QUESTIONNAIRE - PHQ9
1. LITTLE INTEREST OR PLEASURE IN DOING THINGS: NOT AT ALL
SUM OF ALL RESPONSES TO PHQ9 QUESTIONS 1 AND 2: 0
2. FEELING DOWN, DEPRESSED OR HOPELESS: NOT AT ALL

## 2024-04-22 ASSESSMENT — PAIN SCALES - GENERAL: PAINLEVEL: 4

## 2024-04-22 ASSESSMENT — COLUMBIA-SUICIDE SEVERITY RATING SCALE - C-SSRS: 1. IN THE PAST MONTH, HAVE YOU WISHED YOU WERE DEAD OR WISHED YOU COULD GO TO SLEEP AND NOT WAKE UP?: NO

## 2024-04-22 NOTE — PROGRESS NOTES
"Subjective   Patient ID: James Brambila is a 48 y.o. male who presents for Back Pain (2months).    Back Pain  This is a chronic problem. The current episode started more than 1 month ago. The problem occurs constantly. The problem has been gradually worsening since onset. The pain is present in the sacro-iliac. The quality of the pain is described as aching, burning, cramping and shooting. The pain radiates to the left thigh and right thigh. The pain is at a severity of 9/10. The pain is Worse during the night. The symptoms are aggravated by coughing, position, lying down, sitting and standing. Stiffness is present All day. Associated symptoms include leg pain, numbness, paresthesias, pelvic pain and weakness. Pertinent negatives include no abdominal pain, bladder incontinence, bowel incontinence, chest pain, dysuria, fever, headaches, paresis, perianal numbness, tingling or weight loss.    patient presents to clinic as problem visit because of increasing low back pain going on over the past 2 months with some radiation to lower extremities.  He denies any bladder or bowel incontinence, weakness of legs, dysuria, fever, nausea vomiting.  He is rating his pain as 4 out of 10.  He is also concerned about weight gain of more than 40 pounds over the past 4 months secondary to venlafaxine.  He has history of  depression/anxiety,morbid obesity,ptsd,asthma,anni on cpap,and Hypertension .          Review of Systems   Constitutional:  Negative for fever and weight loss.   Cardiovascular:  Negative for chest pain.   Gastrointestinal:  Negative for abdominal pain and bowel incontinence.   Genitourinary:  Positive for pelvic pain. Negative for bladder incontinence and dysuria.   Musculoskeletal:  Positive for back pain.   Neurological:  Positive for weakness, numbness and paresthesias. Negative for tingling and headaches.       Objective   /86   Pulse 86   Ht 1.803 m (5' 11\")   Wt 148 kg (327 lb)   SpO2 96%   BMI 45.61 " kg/m²     Physical Exam  Constitutional:       Appearance: Normal appearance. He is obese.   HENT:      Right Ear: Tympanic membrane normal.      Left Ear: Tympanic membrane and ear canal normal.      Nose: Nose normal.   Neck:      Vascular: No carotid bruit.   Cardiovascular:      Rate and Rhythm: Normal rate.   Pulmonary:      Effort: No respiratory distress.      Breath sounds: No stridor. No wheezing.   Abdominal:      Palpations: Abdomen is soft.      Tenderness: There is no abdominal tenderness. There is no guarding or rebound.   Skin:     Coloration: Skin is not jaundiced.      Findings: No bruising.   Neurological:      General: No focal deficit present.      Mental Status: He is alert and oriented to person, place, and time.   Psychiatric:         Mood and Affect: Mood normal.         Assessment/Plan patient will be referred to nutrition services and is encouraged to follow Mediterranean diet, exercise and do lifestyle modification regarding obesity.  He will be started on gabapentin and duloxetine regarding chronic low back pain.  He will  be also referred to pain management.  He will continue other medications and will return to his scheduled appointment unless he has any worsening of his symptoms.

## 2024-04-24 ENCOUNTER — APPOINTMENT (OUTPATIENT)
Dept: BEHAVIORAL HEALTH | Facility: CLINIC | Age: 49
End: 2024-04-24
Payer: COMMERCIAL

## 2024-04-26 ASSESSMENT — ENCOUNTER SYMPTOMS
PARESIS: 0
DYSURIA: 0
FEVER: 0
WEIGHT LOSS: 0
PARESTHESIAS: 1
BOWEL INCONTINENCE: 0
HEADACHES: 0
LEG PAIN: 1
ABDOMINAL PAIN: 0
NUMBNESS: 1
BACK PAIN: 1
TINGLING: 0
PERIANAL NUMBNESS: 0
WEAKNESS: 1

## 2024-05-18 DIAGNOSIS — F41.8 DEPRESSION WITH ANXIETY: ICD-10-CM

## 2024-05-18 DIAGNOSIS — F41.9 ANXIETY: ICD-10-CM

## 2024-05-18 DIAGNOSIS — J45.909 ASTHMA, UNSPECIFIED ASTHMA SEVERITY, UNSPECIFIED WHETHER COMPLICATED, UNSPECIFIED WHETHER PERSISTENT (HHS-HCC): ICD-10-CM

## 2024-05-18 RX ORDER — BUSPIRONE HYDROCHLORIDE 10 MG/1
10 TABLET ORAL 2 TIMES DAILY
Qty: 180 TABLET | Refills: 0 | Status: SHIPPED | OUTPATIENT
Start: 2024-05-18

## 2024-05-18 RX ORDER — MOMETASONE FUROATE AND FORMOTEROL FUMARATE DIHYDRATE 100; 5 UG/1; UG/1
1 AEROSOL RESPIRATORY (INHALATION)
Qty: 8.8 G | Refills: 1 | Status: SHIPPED | OUTPATIENT
Start: 2024-05-18 | End: 2024-07-17

## 2024-08-14 DIAGNOSIS — F41.8 DEPRESSION WITH ANXIETY: ICD-10-CM

## 2024-08-14 DIAGNOSIS — F41.9 ANXIETY: ICD-10-CM

## 2024-08-14 RX ORDER — BUSPIRONE HYDROCHLORIDE 10 MG/1
10 TABLET ORAL 2 TIMES DAILY
Qty: 180 TABLET | Refills: 1 | Status: SHIPPED | OUTPATIENT
Start: 2024-08-14

## 2024-08-19 ENCOUNTER — OFFICE VISIT (OUTPATIENT)
Dept: PRIMARY CARE | Facility: CLINIC | Age: 49
End: 2024-08-19
Payer: COMMERCIAL

## 2024-08-19 ENCOUNTER — PATIENT MESSAGE (OUTPATIENT)
Dept: PRIMARY CARE | Facility: CLINIC | Age: 49
End: 2024-08-19

## 2024-08-19 VITALS
OXYGEN SATURATION: 98 % | SYSTOLIC BLOOD PRESSURE: 130 MMHG | DIASTOLIC BLOOD PRESSURE: 78 MMHG | HEART RATE: 79 BPM | BODY MASS INDEX: 44.1 KG/M2 | WEIGHT: 315 LBS | HEIGHT: 71 IN

## 2024-08-19 DIAGNOSIS — G25.81 RESTLESS LEG SYNDROME: ICD-10-CM

## 2024-08-19 DIAGNOSIS — J45.40 MODERATE PERSISTENT ASTHMA WITHOUT COMPLICATION (HHS-HCC): ICD-10-CM

## 2024-08-19 DIAGNOSIS — E66.01 OBESITY, MORBID, BMI 40.0-49.9 (MULTI): ICD-10-CM

## 2024-08-19 DIAGNOSIS — G47.33 OSA ON CPAP: ICD-10-CM

## 2024-08-19 DIAGNOSIS — D50.9 IRON DEFICIENCY ANEMIA, UNSPECIFIED IRON DEFICIENCY ANEMIA TYPE: ICD-10-CM

## 2024-08-19 DIAGNOSIS — I10 HYPERTENSION, ESSENTIAL: ICD-10-CM

## 2024-08-19 DIAGNOSIS — Z76.89 ENCOUNTER TO ESTABLISH CARE: Primary | ICD-10-CM

## 2024-08-19 DIAGNOSIS — E78.5 DYSLIPIDEMIA: ICD-10-CM

## 2024-08-19 DIAGNOSIS — E11.9 TYPE 2 DIABETES MELLITUS WITHOUT COMPLICATION, WITHOUT LONG-TERM CURRENT USE OF INSULIN (MULTI): ICD-10-CM

## 2024-08-19 PROBLEM — R10.9 ABDOMINAL PAIN: Status: RESOLVED | Noted: 2023-10-02 | Resolved: 2024-08-19

## 2024-08-19 PROBLEM — J45.909 ASTHMA (HHS-HCC): Status: RESOLVED | Noted: 2023-07-16 | Resolved: 2024-08-19

## 2024-08-19 PROBLEM — R03.0 BLOOD PRESSURE ELEVATED WITHOUT HISTORY OF HTN: Status: RESOLVED | Noted: 2023-07-16 | Resolved: 2024-08-19

## 2024-08-19 PROBLEM — G47.19 EXCESSIVE DAYTIME SLEEPINESS: Status: RESOLVED | Noted: 2023-07-16 | Resolved: 2024-08-19

## 2024-08-19 PROBLEM — Z88.9 HISTORY OF ALLERGY: Status: RESOLVED | Noted: 2023-10-02 | Resolved: 2024-08-19

## 2024-08-19 PROBLEM — H92.09 OTALGIA: Status: RESOLVED | Noted: 2019-07-02 | Resolved: 2024-08-19

## 2024-08-19 PROBLEM — R21 SKIN ERUPTION: Status: RESOLVED | Noted: 2019-07-02 | Resolved: 2024-08-19

## 2024-08-19 PROBLEM — B00.9 HERPES SIMPLEX: Status: RESOLVED | Noted: 2023-07-16 | Resolved: 2024-08-19

## 2024-08-19 PROBLEM — M54.9 BACKACHE: Status: RESOLVED | Noted: 2019-07-02 | Resolved: 2024-08-19

## 2024-08-19 PROBLEM — B00.1 RECURRENT COLD SORES: Status: ACTIVE | Noted: 2024-08-19

## 2024-08-19 PROBLEM — R73.9 HYPERGLYCEMIA: Status: RESOLVED | Noted: 2022-09-08 | Resolved: 2024-08-19

## 2024-08-19 PROBLEM — R29.898 WEAKNESS OF BACK: Status: RESOLVED | Noted: 2022-09-22 | Resolved: 2024-08-19

## 2024-08-19 PROCEDURE — 3075F SYST BP GE 130 - 139MM HG: CPT

## 2024-08-19 PROCEDURE — 99204 OFFICE O/P NEW MOD 45 MIN: CPT

## 2024-08-19 PROCEDURE — 3044F HG A1C LEVEL LT 7.0%: CPT

## 2024-08-19 PROCEDURE — 1036F TOBACCO NON-USER: CPT

## 2024-08-19 PROCEDURE — 3078F DIAST BP <80 MM HG: CPT

## 2024-08-19 PROCEDURE — 3008F BODY MASS INDEX DOCD: CPT

## 2024-08-19 PROCEDURE — 4010F ACE/ARB THERAPY RXD/TAKEN: CPT

## 2024-08-19 RX ORDER — TIRZEPATIDE 2.5 MG/.5ML
2.5 INJECTION, SOLUTION SUBCUTANEOUS
Qty: 2 ML | Refills: 0 | Status: SHIPPED | OUTPATIENT
Start: 2024-08-25 | End: 2024-08-19

## 2024-08-19 RX ORDER — TIRZEPATIDE 2.5 MG/.5ML
2.5 INJECTION, SOLUTION SUBCUTANEOUS
Qty: 2 ML | Refills: 1 | Status: SHIPPED | OUTPATIENT
Start: 2024-08-25 | End: 2024-08-19 | Stop reason: SDUPTHER

## 2024-08-19 RX ORDER — TIRZEPATIDE 2.5 MG/.5ML
2.5 INJECTION, SOLUTION SUBCUTANEOUS
Qty: 2 ML | Refills: 0 | Status: SHIPPED | OUTPATIENT
Start: 2024-08-25 | End: 2024-08-20 | Stop reason: SDUPTHER

## 2024-08-19 ASSESSMENT — ENCOUNTER SYMPTOMS
WHEEZING: 0
DIZZINESS: 0
POLYDIPSIA: 0
COUGH: 0
SHORTNESS OF BREATH: 1
PALPITATIONS: 0
FATIGUE: 1
POLYPHAGIA: 0

## 2024-08-19 ASSESSMENT — PAIN SCALES - GENERAL: PAINLEVEL: 2

## 2024-08-19 NOTE — PATIENT INSTRUCTIONS
Can increase Buspar to 10 mg in the morning and 20 mg at night, if tolerating can then increase to 20 mg twice daily. Stop Glimepiride, continue Ozempic until we are able to get Mounjaro prior auth approved. Can take gabapentin 300 mg for 2-3 nights then stop.

## 2024-08-19 NOTE — PROGRESS NOTES
Subjective   Patient ID: James Brambila is a 48 y.o. male who presents for Establish Care (Discuss diabetes/ back pain, weight gain, fatigue /la).    Hx of NIDDM, HTN, GERD, Asthma, MARITZA (on CPAP)    Other providers: none    DM: controlled. Last A1C 5.9 (2/20/24). Has been on 1000 mg once a day, and 4 mg glimepiride once a day, and 0.25 Ozempic (had bad GI upset when he tried increase to 0.5 mg). Blood sugars in the morning is 115 -140. Last microalbumin due and GFR 83 (2/2024). On ARB yes. Not on statin but CT calcium score was 0 (4/17/24). Last eye exam and foot exam unknown. PNA not UTD. Patient goal is too lose more weight. Denies hypoglycemic incidents, polyphagia, polydipsia, polyuria.      HTN: controlled. On 20 mg Olmesartan. No medication side effects. Home BP, not checking. +SOB. Denies chest pain, heart palpitations, dizziness, headaches, changes of vision, syncope, leg swelling.      Asthma: using Dulera daily and albuterol prn. Patient feels has needed albuterol more than normally. No exacerbations in the last 6 months. Patient did not like Advair.     Back pain x 6 months. Was started on  Cymbalta and gabapentin about 3 months for back pain. Patient stopped Cymbalta (was having increased sweating). Patient feels gabapentin making him too sleeping. Patient would like to stop gabapentin.     Anxiety: was placed on Buspar about a year. Has tried Effexor, did not like feeling on. Patient has also tried and failed Prozac (sweating), lexapro (zombie feeling), and Zoloft (weight gain) as well. Patient feels anxiety is not controlled currently. Denies SI, depression.     MARITZA: patient has used CPAP for 7 years, but does not feel completely helps. Feels un-rested and exhausted. Open to see sleep specialist.            Review of Systems   Constitutional:  Positive for fatigue.   Eyes:  Negative for visual disturbance.   Respiratory:  Positive for shortness of breath. Negative for cough and wheezing.    Cardiovascular:  " Negative for chest pain, palpitations and leg swelling.   Endocrine: Negative for polydipsia, polyphagia and polyuria.   Neurological:  Negative for dizziness and syncope.       Objective   /78   Pulse 79   Ht 1.803 m (5' 11\")   Wt 150 kg (330 lb)   SpO2 98%   BMI 46.03 kg/m²     Physical Exam  Constitutional:       General: He is not in acute distress.     Appearance: Normal appearance.   Cardiovascular:      Rate and Rhythm: Normal rate and regular rhythm.      Heart sounds: No murmur heard.  Pulmonary:      Effort: Pulmonary effort is normal.      Breath sounds: Normal breath sounds. No wheezing, rhonchi or rales.   Musculoskeletal:      Right lower leg: No edema.      Left lower leg: No edema.   Skin:     General: Skin is warm and dry.      Findings: No rash.   Neurological:      Mental Status: He is alert.   Psychiatric:         Mood and Affect: Mood and affect normal.         Assessment/Plan   Diagnoses and all orders for this visit:  Encounter to establish care  MARITZA on CPAP  -     Referral to Adult Sleep Medicine; Future  Restless leg syndrome  -     Referral to Adult Sleep Medicine; Future  Type 2 diabetes mellitus without complication, without long-term current use of insulin (Multi)  -     tirzepatide (Mounjaro) 2.5 mg/0.5 mL pen injector; Inject 2.5 mg under the skin 1 (one) time per week.  -     Albumin-Creatinine Ratio, Urine Random; Future  Obesity, morbid, BMI 40.0-49.9 (Multi)  Hypertension, essential  Iron deficiency anemia, unspecified iron deficiency anemia type  Moderate persistent [facility] without [facility]  Dyslipidemia  -     Lipid panel; Future  Other orders  -     Follow Up In Primary Care - Established  -Unable to tolerate higher dose of Ozempic, so will try to switch GLP-1 to Mounjaro so can increase dose. Stop Glimepiride. Can increase Buspar to 10 mg in the morning and 20 mg at night, if tolerating can then increase to 20 mg twice daily. Can take gabapentin 300 mg " for 2-3 nights then stop. Declined PT for back has done in the past, but will discuss more at next visit. Follow-up one month for DM/Back pain.

## 2024-08-20 ENCOUNTER — PHARMACY VISIT (OUTPATIENT)
Dept: PHARMACY | Facility: CLINIC | Age: 49
End: 2024-08-20
Payer: COMMERCIAL

## 2024-08-20 PROCEDURE — RXMED WILLOW AMBULATORY MEDICATION CHARGE

## 2024-08-20 RX ORDER — TIRZEPATIDE 2.5 MG/.5ML
2.5 INJECTION, SOLUTION SUBCUTANEOUS
Qty: 2 ML | Refills: 0 | Status: SHIPPED | OUTPATIENT
Start: 2024-08-25

## 2024-08-21 ENCOUNTER — APPOINTMENT (OUTPATIENT)
Dept: PRIMARY CARE | Facility: CLINIC | Age: 49
End: 2024-08-21
Payer: COMMERCIAL

## 2024-09-03 ENCOUNTER — APPOINTMENT (OUTPATIENT)
Dept: PRIMARY CARE | Facility: CLINIC | Age: 49
End: 2024-09-03
Payer: COMMERCIAL

## 2024-09-09 ENCOUNTER — E-VISIT (OUTPATIENT)
Dept: PRIMARY CARE | Facility: CLINIC | Age: 49
End: 2024-09-09
Payer: COMMERCIAL

## 2024-09-09 DIAGNOSIS — I10 HYPERTENSION, UNSPECIFIED TYPE: ICD-10-CM

## 2024-09-09 RX ORDER — OLMESARTAN MEDOXOMIL 20 MG/1
20 TABLET ORAL DAILY
Qty: 90 TABLET | Refills: 3 | Status: SHIPPED | OUTPATIENT
Start: 2024-09-09

## 2024-09-12 DIAGNOSIS — E11.9 TYPE 2 DIABETES MELLITUS WITHOUT COMPLICATION, WITHOUT LONG-TERM CURRENT USE OF INSULIN (MULTI): ICD-10-CM

## 2024-09-12 DIAGNOSIS — E11.9 TYPE 2 DIABETES MELLITUS WITHOUT COMPLICATION, WITHOUT LONG-TERM CURRENT USE OF INSULIN (MULTI): Primary | ICD-10-CM

## 2024-09-12 RX ORDER — TIRZEPATIDE 5 MG/.5ML
5 INJECTION, SOLUTION SUBCUTANEOUS WEEKLY
Qty: 6 ML | Refills: 1 | Status: SHIPPED | OUTPATIENT
Start: 2024-09-12

## 2024-09-12 RX ORDER — TIRZEPATIDE 2.5 MG/.5ML
2.5 INJECTION, SOLUTION SUBCUTANEOUS
Qty: 2 ML | Refills: 0 | OUTPATIENT
Start: 2024-09-15

## 2024-09-20 ASSESSMENT — ENCOUNTER SYMPTOMS
BLACKOUTS: 0
NERVOUS/ANXIOUS: 1
SWEATS: 1
TREMORS: 0
POLYPHAGIA: 1
HUNGER: 1
SPEECH DIFFICULTY: 0
VISUAL CHANGE: 0
FATIGUE: 0
SEIZURES: 0
DIZZINESS: 0
BLURRED VISION: 0
HEADACHES: 1
POLYDIPSIA: 1
WEIGHT LOSS: 1
WEAKNESS: 0
CONFUSION: 0

## 2024-09-25 ENCOUNTER — LAB (OUTPATIENT)
Dept: LAB | Facility: LAB | Age: 49
End: 2024-09-25
Payer: COMMERCIAL

## 2024-09-25 DIAGNOSIS — G89.29 CHRONIC BILATERAL LOW BACK PAIN WITHOUT SCIATICA: ICD-10-CM

## 2024-09-25 DIAGNOSIS — E11.9 TYPE 2 DIABETES MELLITUS WITHOUT COMPLICATION, WITHOUT LONG-TERM CURRENT USE OF INSULIN (MULTI): ICD-10-CM

## 2024-09-25 DIAGNOSIS — E78.5 DYSLIPIDEMIA: ICD-10-CM

## 2024-09-25 DIAGNOSIS — M54.50 CHRONIC BILATERAL LOW BACK PAIN WITHOUT SCIATICA: ICD-10-CM

## 2024-09-25 LAB
ALBUMIN SERPL BCP-MCNC: 4.3 G/DL (ref 3.4–5)
ALP SERPL-CCNC: 76 U/L (ref 33–120)
ALT SERPL W P-5'-P-CCNC: 59 U/L (ref 10–52)
ANION GAP SERPL CALC-SCNC: 14 MMOL/L (ref 10–20)
APPEARANCE UR: CLEAR
AST SERPL W P-5'-P-CCNC: 36 U/L (ref 9–39)
BILIRUB SERPL-MCNC: 0.4 MG/DL (ref 0–1.2)
BILIRUB UR STRIP.AUTO-MCNC: NEGATIVE MG/DL
BUN SERPL-MCNC: 14 MG/DL (ref 6–23)
CALCIUM SERPL-MCNC: 9.4 MG/DL (ref 8.6–10.6)
CHLORIDE SERPL-SCNC: 102 MMOL/L (ref 98–107)
CHOLEST SERPL-MCNC: 160 MG/DL (ref 0–199)
CHOLESTEROL/HDL RATIO: 4.7
CO2 SERPL-SCNC: 28 MMOL/L (ref 21–32)
COLOR UR: YELLOW
CREAT SERPL-MCNC: 1.05 MG/DL (ref 0.5–1.3)
CREAT UR-MCNC: 265.8 MG/DL (ref 20–370)
EGFRCR SERPLBLD CKD-EPI 2021: 88 ML/MIN/1.73M*2
EST. AVERAGE GLUCOSE BLD GHB EST-MCNC: 143 MG/DL
GLUCOSE SERPL-MCNC: 129 MG/DL (ref 74–99)
GLUCOSE UR STRIP.AUTO-MCNC: NORMAL MG/DL
GRAN CASTS #/AREA UR COMP ASSIST: ABNORMAL /LPF
HBA1C MFR BLD: 6.6 %
HDLC SERPL-MCNC: 33.7 MG/DL
KETONES UR STRIP.AUTO-MCNC: NEGATIVE MG/DL
LDLC SERPL CALC-MCNC: 84 MG/DL
LEUKOCYTE ESTERASE UR QL STRIP.AUTO: NEGATIVE
MICROALBUMIN UR-MCNC: 13.8 MG/L
MICROALBUMIN/CREAT UR: 5.2 UG/MG CREAT
MUCOUS THREADS #/AREA URNS AUTO: ABNORMAL /LPF
NITRITE UR QL STRIP.AUTO: NEGATIVE
NON HDL CHOLESTEROL: 126 MG/DL (ref 0–149)
PH UR STRIP.AUTO: 5.5 [PH]
POTASSIUM SERPL-SCNC: 4.3 MMOL/L (ref 3.5–5.3)
PROT SERPL-MCNC: 6.9 G/DL (ref 6.4–8.2)
PROT UR STRIP.AUTO-MCNC: NORMAL MG/DL
RBC # UR STRIP.AUTO: NEGATIVE /UL
RBC #/AREA URNS AUTO: ABNORMAL /HPF
SODIUM SERPL-SCNC: 140 MMOL/L (ref 136–145)
SP GR UR STRIP.AUTO: 1.03
TRIGL SERPL-MCNC: 214 MG/DL (ref 0–149)
UROBILINOGEN UR STRIP.AUTO-MCNC: NORMAL MG/DL
VLDL: 43 MG/DL (ref 0–40)
WBC #/AREA URNS AUTO: ABNORMAL /HPF

## 2024-09-25 PROCEDURE — 36415 COLL VENOUS BLD VENIPUNCTURE: CPT

## 2024-09-25 PROCEDURE — 82043 UR ALBUMIN QUANTITATIVE: CPT

## 2024-09-25 PROCEDURE — 83036 HEMOGLOBIN GLYCOSYLATED A1C: CPT

## 2024-09-25 PROCEDURE — 81001 URINALYSIS AUTO W/SCOPE: CPT

## 2024-09-25 PROCEDURE — 80053 COMPREHEN METABOLIC PANEL: CPT

## 2024-09-25 PROCEDURE — 80061 LIPID PANEL: CPT

## 2024-09-25 PROCEDURE — 82570 ASSAY OF URINE CREATININE: CPT

## 2024-09-26 ENCOUNTER — OFFICE VISIT (OUTPATIENT)
Dept: PRIMARY CARE | Facility: CLINIC | Age: 49
End: 2024-09-26
Payer: COMMERCIAL

## 2024-09-26 VITALS
OXYGEN SATURATION: 97 % | WEIGHT: 315 LBS | HEIGHT: 71 IN | SYSTOLIC BLOOD PRESSURE: 140 MMHG | DIASTOLIC BLOOD PRESSURE: 82 MMHG | BODY MASS INDEX: 44.1 KG/M2 | HEART RATE: 90 BPM

## 2024-09-26 DIAGNOSIS — M54.42 CHRONIC BILATERAL LOW BACK PAIN WITH BILATERAL SCIATICA: Primary | ICD-10-CM

## 2024-09-26 DIAGNOSIS — E11.9 TYPE 2 DIABETES MELLITUS WITHOUT COMPLICATION, WITHOUT LONG-TERM CURRENT USE OF INSULIN (MULTI): ICD-10-CM

## 2024-09-26 DIAGNOSIS — G89.29 CHRONIC BILATERAL LOW BACK PAIN WITH BILATERAL SCIATICA: Primary | ICD-10-CM

## 2024-09-26 DIAGNOSIS — F41.9 ANXIETY: ICD-10-CM

## 2024-09-26 DIAGNOSIS — F41.8 DEPRESSION WITH ANXIETY: ICD-10-CM

## 2024-09-26 DIAGNOSIS — M54.41 CHRONIC BILATERAL LOW BACK PAIN WITH BILATERAL SCIATICA: Primary | ICD-10-CM

## 2024-09-26 PROCEDURE — 3079F DIAST BP 80-89 MM HG: CPT

## 2024-09-26 PROCEDURE — 4010F ACE/ARB THERAPY RXD/TAKEN: CPT

## 2024-09-26 PROCEDURE — 3048F LDL-C <100 MG/DL: CPT

## 2024-09-26 PROCEDURE — 3044F HG A1C LEVEL LT 7.0%: CPT

## 2024-09-26 PROCEDURE — 1036F TOBACCO NON-USER: CPT

## 2024-09-26 PROCEDURE — 90677 PCV20 VACCINE IM: CPT

## 2024-09-26 PROCEDURE — 3077F SYST BP >= 140 MM HG: CPT

## 2024-09-26 PROCEDURE — 3008F BODY MASS INDEX DOCD: CPT

## 2024-09-26 PROCEDURE — 3061F NEG MICROALBUMINURIA REV: CPT

## 2024-09-26 PROCEDURE — 90471 IMMUNIZATION ADMIN: CPT

## 2024-09-26 PROCEDURE — 99214 OFFICE O/P EST MOD 30 MIN: CPT

## 2024-09-26 RX ORDER — BUSPIRONE HYDROCHLORIDE 30 MG/1
30 TABLET ORAL 2 TIMES DAILY
Qty: 180 TABLET | Refills: 1 | Status: SHIPPED | OUTPATIENT
Start: 2024-09-26

## 2024-09-26 ASSESSMENT — PAIN SCALES - GENERAL: PAINLEVEL: 2

## 2024-09-26 ASSESSMENT — ENCOUNTER SYMPTOMS
TREMORS: 0
FATIGUE: 0
CONFUSION: 0
BLACKOUTS: 0
POLYDIPSIA: 1
BLURRED VISION: 0
POLYPHAGIA: 0
SWEATS: 1
HEADACHES: 1
HUNGER: 1
SPEECH DIFFICULTY: 0
SEIZURES: 0
NERVOUS/ANXIOUS: 1
VISUAL CHANGE: 0
WEAKNESS: 0
WEIGHT LOSS: 1
DIZZINESS: 0

## 2024-09-26 NOTE — PROGRESS NOTES
Subjective   Patient ID: James Brambila is a 49 y.o. male who presents for Follow-up (Diabetes /la).    Hx of NIDDM, HTN, GERD, Asthma, MARITZA (on CPAP)    Other providers: none    DM: controlled. Last A1C 5.9-> 6.6 (9/26/24). Increased to 1000 mg Metformin BID. Last visit stopped glimepiride, and started on Mounjaro 2.5 mg since GI upset of Ozempic. Blood sugars in the morning is 120 -150. Last microalbumin normal (9/26/24) and GFR 88 (9/2024). On ARB yes. Not on statin but CT calcium score was 0 (4/17/24). Last eye exam 7/2024 with Ophthalmologist and foot exam today 9/26/24. PNA UTD today. Patient goal is too lose more weight. +polydipsia. Interested in DM educator. Denies hypoglycemic incidents, polyphagia, polyuria.      Back pain x 6 months. Was started on  Cymbalta and gabapentin about 3 months for back pain. Patient stopped Cymbalta (was having increased sweating). Patient was able to come off gabapentin. feels gabapentin making him too sleeping. Sleep is okay now. Patient states right now back pain is manageable. Spine no longer hurts but has bilateral hip and upper leg pain. Bending increases pain. Patient states has had back on/off for years and has completed PT in the past and did not help.    Anxiety: was placed on Buspar about a year. Last visit increased dose to 20 mg BID, noticed some slight improvement but would like to try further increase. Has tried Effexor, did not like feeling on. Patient has also tried and failed Prozac (sweating), lexapro (zombie feeling), and Zoloft (weight gain) as well. Patient feels anxiety is not controlled currently. Denies SI, depression.     Not discussed:  -HTN: controlled. On 20 mg Olmesartan. No medication side effects. Home BP, not checking. +SOB. Denies chest pain, heart palpitations, dizziness, headaches, changes of vision, syncope, leg swelling.    -Asthma: using Dulera daily and albuterol prn. Patient feels has needed albuterol more than normally. No exacerbations  in the last 6 months. Patient did not like Advair.   -MARITZA: patient has used CPAP for 7 years, but does not feel completely helps. Feels un-rested and exhausted. Open to see sleep specialist.       Diabetes  He has type 2 diabetes mellitus. No MedicAlert identification noted. The initial diagnosis of diabetes was made 1 years ago. Hypoglycemia symptoms include headaches, hunger, mood changes, nervousness/anxiousness, sleepiness and sweats. Pertinent negatives for hypoglycemia include no confusion, dizziness, pallor, seizures, speech difficulty or tremors. Associated symptoms include polydipsia and weight loss. Pertinent negatives for diabetes include no blurred vision, no chest pain, no fatigue, no foot paresthesias, no foot ulcerations, no polyphagia, no polyuria, no visual change and no weakness. Pertinent negatives for hypoglycemia complications include no blackouts, no hospitalization, no nocturnal hypoglycemia, no required assistance and no required glucagon injection. Symptoms are improving. Pertinent negatives for diabetic complications include no CVA, heart disease, impotence, nephropathy, peripheral neuropathy, PVD or retinopathy. Risk factors for coronary artery disease include family history, hypertension, obesity, sedentary lifestyle and stress. Current diabetic treatment includes oral agent (dual therapy). He is compliant with treatment all of the time. His weight is decreasing steadily. When asked about meal planning, he reported none. He has not had a previous visit with a dietitian. He never participates in exercise. He monitors blood glucose at home 1-2 x per day. He monitors urine at home <1 x per month. Blood glucose monitoring compliance is excellent. His home blood glucose trend is decreasing steadily. His breakfast blood glucose is taken between 7-8 am. His breakfast blood glucose range is generally 130-140 mg/dl. His lunch blood glucose is taken between 12-1 pm. His dinner blood glucose is  "taken between 6-7 pm. His bedtime blood glucose is taken between 10-11 pm. He does not see a podiatrist.Eye exam is current.        Review of Systems   Constitutional:  Positive for weight loss. Negative for fatigue.   Eyes:  Negative for blurred vision.   Cardiovascular:  Negative for chest pain.   Endocrine: Positive for polydipsia. Negative for polyphagia and polyuria.   Genitourinary:  Negative for impotence.   Skin:  Negative for pallor.   Neurological:  Positive for headaches. Negative for dizziness, tremors, seizures, speech difficulty and weakness.   Psychiatric/Behavioral:  Negative for confusion. The patient is nervous/anxious.        Objective   /82   Pulse 90   Ht 1.803 m (5' 11\")   Wt 143 kg (315 lb)   SpO2 97%   BMI 43.93 kg/m²     Physical Exam  Constitutional:       General: He is not in acute distress.     Appearance: Normal appearance.   Cardiovascular:      Rate and Rhythm: Normal rate and regular rhythm.      Pulses:           Dorsalis pedis pulses are 2+ on the right side and 2+ on the left side.        Posterior tibial pulses are 2+ on the right side and 2+ on the left side.      Heart sounds: No murmur heard.  Pulmonary:      Effort: Pulmonary effort is normal.      Breath sounds: Normal breath sounds. No wheezing, rhonchi or rales.   Musculoskeletal:      Right lower leg: No edema.      Left lower leg: No edema.   Feet:      Right foot:      Protective Sensation: 5 sites tested.  5 sites sensed.      Skin integrity: Skin integrity normal.      Left foot:      Protective Sensation: 5 sites tested.  5 sites sensed.      Skin integrity: Skin integrity normal.   Skin:     General: Skin is warm and dry.      Findings: No rash.   Neurological:      Mental Status: He is alert.   Psychiatric:         Mood and Affect: Mood and affect normal.         Assessment/Plan   Diagnoses and all orders for this visit:  Chronic bilateral low back pain with bilateral sciatica  -     Referral to Pain " Medicine; Future  Type 2 diabetes mellitus without complication, without long-term current use of insulin (Multi)  -     Referral to Nutrition Services; Future  Depression with anxiety  -     busPIRone (Buspar) 30 mg tablet; Take 1 tablet (30 mg) by mouth 2 times a day.  Anxiety  -     busPIRone (Buspar) 30 mg tablet; Take 1 tablet (30 mg) by mouth 2 times a day.  Other orders  -     Pneumococcal conjugate vaccine, 20-valent (PREVNAR 20)  -Increase Mounjaro to 5mg, increase buspar to 20 mg BID. Referral to pain management and dietician placed. Follow-up 3 months for DM/A1c/CPE.

## 2024-09-30 ENCOUNTER — TELEMEDICINE CLINICAL SUPPORT (OUTPATIENT)
Dept: NUTRITION | Facility: HOSPITAL | Age: 49
End: 2024-09-30
Payer: COMMERCIAL

## 2024-09-30 VITALS — HEIGHT: 71 IN | BODY MASS INDEX: 43.93 KG/M2

## 2024-09-30 DIAGNOSIS — E11.9 TYPE 2 DIABETES MELLITUS WITHOUT COMPLICATION, WITHOUT LONG-TERM CURRENT USE OF INSULIN (MULTI): Primary | ICD-10-CM

## 2024-09-30 PROCEDURE — 97802 MEDICAL NUTRITION INDIV IN: CPT | Performed by: DIETITIAN, REGISTERED

## 2024-09-30 NOTE — PROGRESS NOTES
"Nutrition Assessment     Reason for Visit:  James Brambila is a 49 y.o. male who presents for nutrition counseling seeking weight loss.      Anthropometrics:  Wt Readings from Last 10 Encounters:   09/26/24 143 kg (315 lb)   08/19/24 150 kg (330 lb)   04/22/24 148 kg (327 lb)   02/21/24 146 kg (322 lb)   11/21/23 137 kg (303 lb)   08/09/23 129 kg (284 lb)   08/03/23 129 kg (284 lb)   09/07/22 (!) 147 kg (324 lb)     Lab Results   Component Value Date    HGBA1C 6.6 (H) 09/25/2024       Anthropometrics  Height: 180.3 cm (5' 11\")    Food And Nutrient Intake:  Food and Nutrient History  Food and Nutrient History: Pt presetns for nutrition counseling seeking weight loss in the context of type II DM.  Pt is taking Mounjaro.  Pt was taking an SSRI which created a weight gain last year.  Pt is having GI side effects with medication.  BMI indicates class III obesity.  HgbA1C is mildly elevated with medication control.  Diet recall reveals that pt has good routines and typically eats 3 meals per day with some snacking.  He states that he does not have a lot of time to eat throughout the day and admits to overeating in the evening, especially after dinner on sweets.  He is not drinking alcohol at this time due to having adverse effect with medication.  Pt may benefit from following a high protein, low carbohyhdrate meal pa=ttern around 2000 calories per day.  Fluid Intake: coffee with chobani creamer; water, unsweet tea; wine and beer- no longer drinking with medication  GI Symptoms: diarrhea  Sleep Duration/Quality: 5-6 hrs disrupted     Food Intake  Meal 1: cheerios, milk, berries, coffee  Meal 2: lunchtime: leftovers,  Meal 3: dinner: meta, salmon, vegetables, starchy foods  Snacks: banana, protein bars     Physical Activities:  Physical Activity  Type of Physical Activity: not currently      Nutrition Focused Physical Exam:  Deferred- phone visit.      Energy Needs  Calculated Energy Needs Using Equations  Height: 180.3 cm " "(5' 11\")  Weight Used for Equation Calculations: 143 kg (315 lb 4.1 oz)  José Manuel Price Equation (Overweight or Obese Patients): 2327  Activity Factor: 1.2  Total Energy Needs: 2792  Estimated Energy Needs  Total Energy Estimated Needs (kCal): 2092 kCal  Method for Estimating Needs: MSJ x 1.2 - 700 kcals/day  Estimated Protein Needs  Total Protein Estimated Needs (g): 105 g  Method for Estimating Needs: 20% of est calories        Nutrition Diagnosis   Malnutrition Diagnosis  Patient has Malnutrition Diagnosis: No  Nutrition Diagnosis  Patient has Nutrition Diagnosis: Yes  Diagnosis Status (1): New  Nutrition Diagnosis 1: Predicted excessive energy intake  Related to (1): obesity; type II DM  As Evidenced by (1): pt interview; diet recall; elevated HgbA1c    Nutrition Interventions/Recommendations   Food and Nutrition Delivery  Meals & Snacks: Carbohydrate-modified diet, Energy-modified diet, Fiber-modified diet, General Healthful Diet, Modify schedule of foods/fluids, Protein-modified diet  Goals: 45 g carb per meal; 3 meals with 1-2 snacks per day; ~2000 daily kcal intake; ensure adequate protein sources with each meal and snack  Nutrition Education  Nutrition Education Content: Content related nutrition education, Education on nutrition's influence on health, Physical activity guidance  Goals: Instructed on consistent carbohydrate intake, blood sugar goals, exercise, proper portion sizes, label reading, and general healthful nutrition. Instructed on benefits of wt loss with diabetes and heart health. Both verbal and written education provided in the one-on-one setting.Pt verbalized understanding of information provided. All questions answered to pt satisfaction throughout visit.        Patient Instructions   Follow the Healthy Plate Method at meals- see handout.  This will help to increase your vegetable intake and decrease portion sizes of carbohydrates.   -Check out [email or web address] or [employer].org for " Mediterranean meal plans and recipes ideas.    When eating starchy foods, try to eat whole grains like potato with the skin, whole grain breads and cereals, brown rices and pastas.  Include protein with all meals and snacks.  Lean protein are better for cholesterol levels such as chicken(no skin), fish (baked or broiled or grilled), low-fat dairy, eggs, and peanut butter or nuts.   - Protein drinks between meals such as Premier Protein, Muscle Milk or Fairlife can help curb appetite.  4.   Reduce your weight by 1-2# per week to reach your goal weight.      - Consider tracking your calorie intake on an antoni such as PrimÃ¢â‚¬â„¢Vision or LoseIt to track your calories.    5.   Increase fiber to 25-3g per day to help keep you reich and lower cholesterol levels.  You may consider a fiber supplement such as Benefiber or Metamucil everyday.    6.   Aim to drink 64 oz of water daily  7.   Aim for 30 minutes of exercise on most days.        Nutrition Monitoring and Evaluation   Food/Nutrient Related History Monitoring  Monitoring and Evaluation Plan: Energy intake, Fluid intake, Meal/snack pattern, Protein intake, Carbohydrate intake, Fiber intake  Criteria: consume 2000 kcal/day  Criteria: aim for 64 oz of water daily, limit sugar sweetened beveraged  Criteria: 3 meals per day with 1-2 snacks between meals  Criteria: ensure adequate protein at each meal and snack ~ 30 g/meal  Estimated carbohydrate intake: Estimated carbohydrate intake  Criteria: 45 g carb per meal  Criteria: ensure adequate fiber is present at meals - focus on 1/2 plate nonstarchy vegetables and choosing whole grain foods when able  Knowledge/Beliefs/Attitudes  Monitoring and Evaluation Plan: Physical activity  Criteria: Encouraged regular physical activity including strength training as medically appropriate per AHA guidelines  Body Composition/Growth/Weight History  Monitoring and Evaluation Plan: Weight change  Weight Change: Weight loss  Criteria: 1-2 lb wt  loss per week  Biochemical Data, Medical Tests and Procedures  Monitoring and Evaluation Plan: Glucose/endocrine profile  Glucose/Endocrine Profile: Hemoglobin A1c (HgbA1c), Glucose, fasting  Criteria: A1c <7%; fasting blood glucose  mg/dl      Readiness to Change : Good  Level of Understanding : Good  Anticipated Compliant : Good

## 2024-09-30 NOTE — PATIENT INSTRUCTIONS
Follow the Healthy Plate Method at meals- see handout.  This will help to increase your vegetable intake and decrease portion sizes of carbohydrates.   -Check out [email or web address] or Instagram.org for Mediterranean meal plans and recipes ideas.    When eating starchy foods, try to eat whole grains like potato with the skin, whole grain breads and cereals, brown rices and pastas.  Include protein with all meals and snacks.  Lean protein are better for cholesterol levels such as chicken(no skin), fish (baked or broiled or grilled), low-fat dairy, eggs, and peanut butter or nuts.   - Protein drinks between meals such as Premier Protein, Muscle Milk or Fairlife can help curb appetite.  4.   Reduce your weight by 1-2# per week to reach your goal weight.      - Consider tracking your calorie intake on an antoni such as AppSame or QuickMobile to track your calories.    5.   Increase fiber to 25-3g per day to help keep you reich and lower cholesterol levels.  You may consider a fiber supplement such as Benefiber or Metamucil everyday.    6.   Aim to drink 64 oz of water daily  7.   Aim for 30 minutes of exercise on most days.

## 2024-10-07 ENCOUNTER — APPOINTMENT (OUTPATIENT)
Dept: SLEEP MEDICINE | Facility: CLINIC | Age: 49
End: 2024-10-07
Payer: COMMERCIAL

## 2024-10-07 VITALS
DIASTOLIC BLOOD PRESSURE: 76 MMHG | SYSTOLIC BLOOD PRESSURE: 140 MMHG | HEIGHT: 71 IN | BODY MASS INDEX: 44.1 KG/M2 | WEIGHT: 315 LBS | HEART RATE: 85 BPM | OXYGEN SATURATION: 98 %

## 2024-10-07 DIAGNOSIS — G47.33 OSA ON CPAP: Primary | ICD-10-CM

## 2024-10-07 DIAGNOSIS — G47.19 EXCESSIVE DAYTIME SLEEPINESS: ICD-10-CM

## 2024-10-07 DIAGNOSIS — E66.01 OBESITY, MORBID, BMI 40.0-49.9 (MULTI): ICD-10-CM

## 2024-10-07 DIAGNOSIS — F51.12 INSUFFICIENT SLEEP SYNDROME: ICD-10-CM

## 2024-10-07 PROCEDURE — 3078F DIAST BP <80 MM HG: CPT | Performed by: INTERNAL MEDICINE

## 2024-10-07 PROCEDURE — 3061F NEG MICROALBUMINURIA REV: CPT | Performed by: INTERNAL MEDICINE

## 2024-10-07 PROCEDURE — 3048F LDL-C <100 MG/DL: CPT | Performed by: INTERNAL MEDICINE

## 2024-10-07 PROCEDURE — 3077F SYST BP >= 140 MM HG: CPT | Performed by: INTERNAL MEDICINE

## 2024-10-07 PROCEDURE — 99204 OFFICE O/P NEW MOD 45 MIN: CPT | Performed by: INTERNAL MEDICINE

## 2024-10-07 PROCEDURE — 3044F HG A1C LEVEL LT 7.0%: CPT | Performed by: INTERNAL MEDICINE

## 2024-10-07 PROCEDURE — 4010F ACE/ARB THERAPY RXD/TAKEN: CPT | Performed by: INTERNAL MEDICINE

## 2024-10-07 PROCEDURE — 3008F BODY MASS INDEX DOCD: CPT | Performed by: INTERNAL MEDICINE

## 2024-10-07 PROCEDURE — 1036F TOBACCO NON-USER: CPT | Performed by: INTERNAL MEDICINE

## 2024-10-07 ASSESSMENT — SLEEP AND FATIGUE QUESTIONNAIRES
SLEEP_PROBLEM_NOTICEABLE_TO_OTHERS: MUCH
SITING INACTIVE IN A PUBLIC PLACE LIKE A CLASS ROOM OR A MOVIE THEATER: MODERATE CHANCE OF DOZING
WAKING_TOO_EARLY: MODERATE
HOW LIKELY ARE YOU TO NOD OFF OR FALL ASLEEP WHEN YOU ARE A PASSENGER IN A CAR FOR AN HOUR WITHOUT A BREAK: MODERATE CHANCE OF DOZING
HOW LIKELY ARE YOU TO NOD OFF OR FALL ASLEEP WHILE SITTING AND TALKING TO SOMEONE: SLIGHT CHANCE OF DOZING
HOW LIKELY ARE YOU TO NOD OFF OR FALL ASLEEP IN A CAR, WHILE STOPPED FOR A FEW MINUTES IN TRAFFIC: SLIGHT CHANCE OF DOZING
ESS-CHAD TOTAL SCORE: 15
DIFFICULTY_FALLING_ASLEEP: MILD
HOW LIKELY ARE YOU TO NOD OFF OR FALL ASLEEP WHILE SITTING QUIETLY AFTER LUNCH WITHOUT ALCOHOL: MODERATE CHANCE OF DOZING
WORRIED_DISTRESSED_DUE_TO_SLEEP: MUCH
SLEEP_PROBLEM_INTERFERES_DAILY_ACTIVITIES: MUCH
HOW LIKELY ARE YOU TO NOD OFF OR FALL ASLEEP WHILE WATCHING TV: MODERATE CHANCE OF DOZING
HOW LIKELY ARE YOU TO NOD OFF OR FALL ASLEEP WHILE LYING DOWN TO REST IN THE AFTERNOON WHEN CIRCUMSTANCES PERMIT: HIGH CHANCE OF DOZING
SATISFACTION_WITH_CURRENT_SLEEP_PATTERN: DISSATISFIED
HOW LIKELY ARE YOU TO NOD OFF OR FALL ASLEEP WHILE SITTING AND READING: MODERATE CHANCE OF DOZING
DIFFICULTY_STAYING_ASLEEP: MILD

## 2024-10-07 ASSESSMENT — PAIN SCALES - GENERAL: PAINLEVEL: 0-NO PAIN

## 2024-10-07 NOTE — ASSESSMENT & PLAN NOTE
Recommend he fill out sleep diaries for us to review next month.  He also is not getting enough sleep.  This may be part of why he is still experiencing daytime sleepiness

## 2024-10-07 NOTE — ASSESSMENT & PLAN NOTE
Is can be multifactorial including insufficient sleep, untreated MARITZA due to age of machine broken CPAP.  We did discuss that depression can make patients feel more fatigued or tired.  Undiagnosed hypersomnolence disorder, or hypoventilation syndrome.  Did discuss considering in-lab testing, with CO2 monitoring, may pursue MSLT

## 2024-10-07 NOTE — ASSESSMENT & PLAN NOTE
"The patient's current CPAP is broken beyond repair. Alvino Brambila \"James\" needs a replacement with remote monitoring capabilities.  Needed download to evaluate at next visit.  Need records of prior sleep studies  "

## 2024-10-07 NOTE — PROGRESS NOTES
"   Patient: Alvino Brambila    [id number]  : 1975 -- AGE 49 y.o.    Provider: Kem Natarajan MD     Location [facility]   Service Date: 10/7/2024              [facility]  New Visit Note      Subjective     HISTORY OF PRESENT ILLNESS     The patient's referring provider is: No ref. provider found    HISTORY OF PRESENT ILLNESS   Alvino Brambila \"James\" is a 49 y.o. male who presents to a [facility] for a sleep medicine evaluation with concerns of Breathing Problem, Unrefreshing Sleep, Excessive Daytime Sleepiness, Restless Legs, Sleep Apnea, and Pap Adherence Followup.     The patient  has a past medical history of Anxiety, Depression, Other conditions influencing health status, Personal history of other diseases of the nervous system and sense organs, Personal history of other diseases of the respiratory system, and Personal history of other specified conditions..    PAST SLEEP HISTORY  Prior sleep study results: not available at today's visit  Seen by Dr. Chandra multiple times.  Checked iron stores and started him on ropinirole.  PLMI 10.      CURRENT HISTORY    On today's visit, the patient presents to discuss Breathing Problem, Unrefreshing Sleep, Excessive Daytime Sleepiness, Restless Legs, Sleep Apnea, and Pap Adherence Followup   His machine is over 5 years old  Lower back pain and reflux disrupting sleep.  He has been using a CPAP and still has daytime sleepiness and does not feel like he is responding  He finds himself dozing off and having difficulty staying awake and works at home  Typically naps for 15-60 minutes    Sleep schedule  on weekdays / work days:  Usual Bedtime 11 PM    Falls asleep around 11:30 PM  Wake time 6 AM    Sleep schedule  on weekends/non work days :  Usual Bedtime 11 PM  Falls asleep around 11:30 PM  Wake time 6 to 7 AM  Naps  Yes , 15 to 60 minutes between 2 to 6 PM, unrefreshing    Total " sleep time average is 6 to 7 hours hours/day    Preferred sleeping position: side lying      REVIEW OF SYSTEMS     REVIEW OF SYSTEMS  Review of Systems    Sleep-related ROS:  snoring, nocturnal awakenings, nocturia, waking up sweaty/night sweats, experience heartburn or sour taste in mouth at night, and dry mouth in the morning    Sleep environment:  Bed partner :  Yes   Pets in bed :   No   Bedroom temperature: BEDROOM TEMP: cool  Noise :   Yes , has fans, likes white noise due to tinnitus  Issues with bed comfort :   No    Sleep Initiation: no problems going to sleep  Problems going to sleep associated with: No problems going to sleep    Sleep Maintenance: prolonged awakenings unusual  Problems staying asleep associated with: Problems Staying Asleep: no clear reason    RLS Screen:  - Sensations: Patient does not have unusual sensations in their extremities that cause an urge to move them , - Movement: Patient has been told that their legs kick or jerk during sleep    Sleep-related behaviors:   DENIES  sleep paralysis  symptoms of cataplexy  sleep walking  kicking, punching, or acting out dreams  REPORTS  dreams upon falling asleep  hypnagogic/hypnopompic hallucinations    Daytime Symptoms    Patient reports some daytime symptoms including: DAYTIME SYMPTOMS: irritability during the day difficulty with memory or concentration during the day feeling sleepy when driving  Patient denies daytime symptoms including: Denies: late to work/school due to sleepiness    ESS 15   Insomnia Severity Index  1. Difficulty falling asleep: Mild  2. Difficulty staying asleep: Mild  3. Problems waking up too early: Moderate  4. How SATISFIED/DISSATISFIED are you with your CURRENT sleep pattern?: Dissatisfied  5. How NOTICEABLE to others do you think your sleep problem is in terms of impairing the quality of your life?: Much  6. How WORRIED/DISTRESSED are you about your current sleep problem?: Much  7. To what extent do you consider  your sleep problem to INTERFERE with your daily functioning (e.g. daytime fatigue, mood, ability to function at work/daily chores, concentration, memory, mood, etc.) CURRENTLY?: Much  NAGI TS: 0-7 = No clinically significant insomnia 8-14 = Subthreshold insomnia 15-21 = Clinical insomnia (moderate severity) 22-28 = Clinical insomnia (severe): 16     FOSQ 27      ALLERGIES AND MEDICATIONS     ALLERGIES  No Known Allergies    MEDICATIONS  Current Outpatient Medications   Medication Sig Dispense Refill    busPIRone (Buspar) 30 mg tablet Take 1 tablet (30 mg) by mouth 2 times a day. 180 tablet 1    lidocaine (Lidoderm) 5 % patch Place 1 patch over 12 hours on the skin once daily. Apply to painful area 12 hours per day, remove for 12 hours. 90 patch 1    metFORMIN (Glucophage) 1,000 mg tablet TAKE 1 TABLET TWICE A DAY WITH MEALS 60 tablet 11    olmesartan (BENIcar) 20 mg tablet Take 1 tablet (20 mg) by mouth once daily. 90 tablet 3    tirzepatide (Mounjaro) 5 mg/0.5 mL pen injector Inject 5 mg under the skin 1 (one) time per week. 6 mL 1    tiZANidine (Zanaflex) 2 mg tablet Take 1 tablet (2 mg) by mouth 2 times a day as needed for muscle spasms. 180 tablet 1    albuterol (ProAir HFA) 90 mcg/actuation inhaler Inhale 2 puffs every 6 hours if needed for wheezing or shortness of breath. 8.5 g 3    mometasone-formoterol (Dulera) 100-5 mcg/actuation inhaler Inhale 1 puff 2 times a day. 8.8 g 1     No current facility-administered medications for this visit.       PAST HISTORY     PAST MEDICAL HISTORY  He  has a past medical history of Anxiety, Depression, Other conditions influencing health status, Personal history of other diseases of the nervous system and sense organs, Personal history of other diseases of the respiratory system, and Personal history of other specified conditions.    PAST SURGICAL HISTORY:  History reviewed. No pertinent surgical history.    FAMILY HISTORY  Family History   Problem Relation Name Age of Onset  "   Hypertension Mother Inocencia Brambila     Anxiety disorder Mother Inocencia Brambila     Hyperlipidemia Mother Inocencia Brambila      DOES/DOES NOT EC: does have a family history of sleep disorder.      SOCIAL HISTORY  He  reports that he has never smoked. He has never used smokeless tobacco. He reports that he does not currently use alcohol. He reports that he does not use drugs.    Patient SOCIAL HISTORY : denies nicotine use  denies alcohol  reports caffeine use  denies marijuana use      PHYSICAL EXAM   Objective   VITAL SIGNS: /76   Pulse 85   Ht 1.803 m (5' 11\")   Wt 143 kg (315 lb)   SpO2 98%   BMI 43.93 kg/m²      CURRENT WEIGHT:   Vitals:    10/07/24 0908   Weight: 143 kg (315 lb)      BMI: Body mass index is 43.93 kg/m².   PREVIOUS WEIGHTS:  Wt Readings from Last 3 Encounters:   10/07/24 143 kg (315 lb)   09/26/24 143 kg (315 lb)   08/19/24 150 kg (330 lb)       Physical Exam  PHYSICAL EXAM: GENERAL: alert pleasant and cooperative no acute distress  nasal mucosa , normal, and pink  MODIFIED SANDS SCORE: IV  MODIFIED MALLAMPATI SCORE: IV (only hard palate visible)  UVULA: midline  TONSILLAR SCORE: 0  TONGUE SCALLOPING: enlarged with scalloping  PSYCH EXAM: alert,oriented, in NAD with a full range of affect, normal behavior and no psychotic features      RESULTS/DATA     Bicarbonate (mmol/L)   Date Value   09/25/2024 28   02/20/2024 28   11/13/2023 29   No results found for: \"FERRITIN\", \"IRONSAT\", \"TIBC\", \"IRON\"     ASSESSMENT/PLAN     Mr. Brambila is a 49 y.o. male and  has a past medical history of Anxiety, Depression, Other conditions influencing health status, Personal history of other diseases of the nervous system and sense organs, Personal history of other diseases of the respiratory system, and Personal history of other specified conditions. He was referred to the [facility] for evaluation of Breathing Problem, Unrefreshing Sleep, Excessive Daytime " "Sleepiness, Restless Legs, Sleep Apnea, and Pap Adherence Followup       Problem List and Orders  Problem List Items Addressed This Visit             ICD-10-CM    Excessive daytime sleepiness G47.19     Is can be multifactorial including insufficient sleep, untreated MARITZA due to age of machine broken CPAP.  We did discuss that depression can make patients feel more fatigued or tired.  Undiagnosed hypersomnolence disorder, or hypoventilation syndrome.  Did discuss considering in-lab testing, with CO2 monitoring, may pursue MSLT         Obesity, morbid, BMI 40.0-49.9 (Multi) E66.01     Need to consider obesity hypoventilation syndrome         MARITZA on CPAP - Primary G47.33     The patient's current CPAP is broken beyond repair. Alvino S Sheppa \"James\" needs a replacement with remote monitoring capabilities.  Needed download to evaluate at next visit.  Need records of prior sleep studies         Insufficient sleep syndrome F51.12     Recommend he fill out sleep diaries for us to review next month.  He also is not getting enough sleep.  This may be part of why he is still experiencing daytime sleepiness                     "

## 2024-10-09 ENCOUNTER — OFFICE VISIT (OUTPATIENT)
Dept: PAIN MEDICINE | Facility: CLINIC | Age: 49
End: 2024-10-09
Payer: COMMERCIAL

## 2024-10-09 VITALS
RESPIRATION RATE: 20 BRPM | HEIGHT: 71 IN | DIASTOLIC BLOOD PRESSURE: 86 MMHG | SYSTOLIC BLOOD PRESSURE: 144 MMHG | BODY MASS INDEX: 44.1 KG/M2 | WEIGHT: 315 LBS | HEART RATE: 83 BPM

## 2024-10-09 DIAGNOSIS — M54.42 CHRONIC BILATERAL LOW BACK PAIN WITH BILATERAL SCIATICA: ICD-10-CM

## 2024-10-09 DIAGNOSIS — G89.29 CHRONIC BILATERAL LOW BACK PAIN WITH BILATERAL SCIATICA: ICD-10-CM

## 2024-10-09 DIAGNOSIS — M54.16 LUMBAR RADICULOPATHY: Primary | ICD-10-CM

## 2024-10-09 DIAGNOSIS — M54.41 CHRONIC BILATERAL LOW BACK PAIN WITH BILATERAL SCIATICA: ICD-10-CM

## 2024-10-09 PROCEDURE — 3008F BODY MASS INDEX DOCD: CPT | Performed by: ANESTHESIOLOGY

## 2024-10-09 PROCEDURE — 3044F HG A1C LEVEL LT 7.0%: CPT | Performed by: ANESTHESIOLOGY

## 2024-10-09 PROCEDURE — 4010F ACE/ARB THERAPY RXD/TAKEN: CPT | Performed by: ANESTHESIOLOGY

## 2024-10-09 PROCEDURE — 1036F TOBACCO NON-USER: CPT | Performed by: ANESTHESIOLOGY

## 2024-10-09 PROCEDURE — 99214 OFFICE O/P EST MOD 30 MIN: CPT | Performed by: ANESTHESIOLOGY

## 2024-10-09 PROCEDURE — 3061F NEG MICROALBUMINURIA REV: CPT | Performed by: ANESTHESIOLOGY

## 2024-10-09 PROCEDURE — 3048F LDL-C <100 MG/DL: CPT | Performed by: ANESTHESIOLOGY

## 2024-10-09 PROCEDURE — 99204 OFFICE O/P NEW MOD 45 MIN: CPT | Performed by: ANESTHESIOLOGY

## 2024-10-09 PROCEDURE — 3079F DIAST BP 80-89 MM HG: CPT | Performed by: ANESTHESIOLOGY

## 2024-10-09 PROCEDURE — 3077F SYST BP >= 140 MM HG: CPT | Performed by: ANESTHESIOLOGY

## 2024-10-09 RX ORDER — METHYLPREDNISOLONE 4 MG/1
TABLET ORAL
Qty: 21 TABLET | Refills: 0 | Status: SHIPPED | OUTPATIENT
Start: 2024-10-09 | End: 2024-10-16

## 2024-10-09 ASSESSMENT — PATIENT HEALTH QUESTIONNAIRE - PHQ9
SUM OF ALL RESPONSES TO PHQ9 QUESTIONS 1 AND 2: 2
10. IF YOU CHECKED OFF ANY PROBLEMS, HOW DIFFICULT HAVE THESE PROBLEMS MADE IT FOR YOU TO DO YOUR WORK, TAKE CARE OF THINGS AT HOME, OR GET ALONG WITH OTHER PEOPLE: SOMEWHAT DIFFICULT
1. LITTLE INTEREST OR PLEASURE IN DOING THINGS: SEVERAL DAYS
2. FEELING DOWN, DEPRESSED OR HOPELESS: SEVERAL DAYS

## 2024-10-09 ASSESSMENT — ENCOUNTER SYMPTOMS
GASTROINTESTINAL NEGATIVE: 1
BACK PAIN: 1
NUMBNESS: 0
PSYCHIATRIC NEGATIVE: 1
ENDOCRINE NEGATIVE: 1
EYES NEGATIVE: 1
CARDIOVASCULAR NEGATIVE: 1
WEAKNESS: 1
CONSTITUTIONAL NEGATIVE: 1
HEMATOLOGIC/LYMPHATIC NEGATIVE: 1
RESPIRATORY NEGATIVE: 1

## 2024-10-09 ASSESSMENT — PAIN SCALES - GENERAL
PAINLEVEL: 2
PAINLEVEL_OUTOF10: 2

## 2024-10-09 ASSESSMENT — LIFESTYLE VARIABLES: TOTAL SCORE: 2

## 2024-10-09 ASSESSMENT — PAIN - FUNCTIONAL ASSESSMENT: PAIN_FUNCTIONAL_ASSESSMENT: 0-10

## 2024-10-09 NOTE — PROGRESS NOTES
PAIN MANAGEMENT NEW PATIENT OFFICE NOTE    Date of Service: 10/9/2024    SUBJECTIVE    CHIEF COMPLAINT: LBP    HISTORY OF PRESENT ILLNESS    Alvino Brambila is a 49 y.o. male with PMH HTN, morbid obesity, GERD, mild intermittent asthma, NIDDM2, MARITZA on CPAP who presents as new patient referred by Eve Wilder PA-C with LB pain.    Pt describes LBP pain since 2008. He feels pain has significantly worsened in last yr.  Pain originates in LB. Pain radiates to BL lateral hips. Pain is worse with activity, standing, walking, prolonged sitting, prolonged laying. Pain is alleviated with laying on side. Pain assoc with BLE weakness most appreciated with climbing stairs and mowing lawn. Pt has tried Tylenol, NSAIDs, lidocaine patch, tizanidine, gabapentin, Cymbalta, >6 w PT (last completed 2023).     Pt denies new-onset numbness, bowel/bladder incontinence.  Pt denies recent infection, allergy to Latex/iodine/contrast. Patient is currently taking the following blood thinner(s): N/A    REVIEW OF SYSTEMS  Review of Systems   Constitutional: Negative.    HENT: Negative.     Eyes: Negative.    Respiratory: Negative.     Cardiovascular: Negative.    Gastrointestinal: Negative.    Endocrine: Negative.    Musculoskeletal:  Positive for back pain.   Skin: Negative.    Neurological:  Positive for weakness. Negative for numbness.   Hematological: Negative.    Psychiatric/Behavioral: Negative.         PAST MEDICAL HISTORY  Past Medical History:   Diagnosis Date    Anxiety     Depression     Other conditions influencing health status     DEXA Body Composition Study    Personal history of other diseases of the nervous system and sense organs     History of acute otitis media    Personal history of other diseases of the respiratory system     Personal history of asthma    Personal history of other specified conditions     History of headache     History reviewed. No pertinent surgical history.  Family History   Problem Relation Name Age  "of Onset    Hypertension Mother Inocencia Brambila     Anxiety disorder Mother Inocencia Brambila     Hyperlipidemia Mother Inocencia Brambila        CURRENT MEDICATIONS  Current Outpatient Medications   Medication Sig Dispense Refill    busPIRone (Buspar) 30 mg tablet Take 1 tablet (30 mg) by mouth 2 times a day. 180 tablet 1    lidocaine (Lidoderm) 5 % patch Place 1 patch over 12 hours on the skin once daily. Apply to painful area 12 hours per day, remove for 12 hours. 90 patch 1    metFORMIN (Glucophage) 1,000 mg tablet TAKE 1 TABLET TWICE A DAY WITH MEALS 60 tablet 11    olmesartan (BENIcar) 20 mg tablet Take 1 tablet (20 mg) by mouth once daily. 90 tablet 3    tirzepatide (Mounjaro) 5 mg/0.5 mL pen injector Inject 5 mg under the skin 1 (one) time per week. 6 mL 1    tiZANidine (Zanaflex) 2 mg tablet Take 1 tablet (2 mg) by mouth 2 times a day as needed for muscle spasms. 180 tablet 1    albuterol (ProAir HFA) 90 mcg/actuation inhaler Inhale 2 puffs every 6 hours if needed for wheezing or shortness of breath. 8.5 g 3    mometasone-formoterol (Dulera) 100-5 mcg/actuation inhaler Inhale 1 puff 2 times a day. 8.8 g 1     No current facility-administered medications for this visit.       ALLERGIES AND DRUG REACTIONS  No Known Allergies       OBJECTIVE  Visit Vitals  /86   Pulse 83   Resp 20   Ht 1.803 m (5' 11\")   Wt 143 kg (315 lb)   BMI 43.93 kg/m²   Smoking Status Never   BSA 2.68 m²       Last Recorded Pain Score (if available):                Physical Exam  Vitals and nursing note reviewed.     General: Sitting in chair, NAD  Head: NCAT  Eyes: Sclera/conjunctiva clear, EOMI, PERRL  Nose/mouth: MMM  CV: Good distal pulses  Lungs: Good/equal chest excursion  Abdomen: Soft, ND  Ext: No cyanosis/edema  MSK: L-spine alignment: unremarkable, BL paraspinal m NTTP, L-spine ROM: flexion lmtd by pain    Neuro: AAOx3, CN grossly nl  Dermatome sensation to light touch  LEFT L1 (lower pelvis/upper thigh): WNL    RIGHT L1: " "WNL      LEFT L2 (upper thigh): WNL       RIGHT: L2:WNL      LEFT L3 (medial knee): WNL       RIGHT L3: WNL      LEFT L4 (superior medial malleolus): WNL       RIGHT L4: WNL      LEFT L5 (dorsal foot): WNL       RIGHT L5: WNL      LEFT S1 (lateral foot): WNL     RIGHT S1: WNL      LEFT S2 (popliteal fossa): WNL    RIGHT S2: WNL        Motor strength  LEFT L2 (hip flexion): 5/5   RIGHT L2: 5/5  LEFT L3 (knee extension): 5/5     RIGHT L3: 5/5  LEFT L4 (dorsiflexion): 5/5     RIGHT L4: 5/5  LEFT L5 (EHL extension): 5/5     RIGHT L5: 5/5  LEFT S1 (plantarflexion): 5/5     RIGHT S1: 5/5  LEFT S2 (knee flexion): 5/5      RIGHT S2: 5/5    Special testing  DTR unremarkable  Seated slump test +LLE  Clonus: neg BL  Babinski: neg BL    Psych: affect nl  Skin: no rash/lesions      REVIEW OF LABORATORY DATA  I have reviewed the following lab results:  WBC   Date Value Ref Range Status   08/04/2023 7.6 4.4 - 11.3 x10E9/L Final     RBC   Date Value Ref Range Status   08/04/2023 4.92 4.50 - 5.90 x10E12/L Final     Hemoglobin   Date Value Ref Range Status   08/04/2023 13.7 13.5 - 17.5 g/dL Final     Hematocrit   Date Value Ref Range Status   08/04/2023 43.8 41.0 - 52.0 % Final     MCV   Date Value Ref Range Status   08/04/2023 89 80 - 100 fL Final     MCHC   Date Value Ref Range Status   08/04/2023 31.3 (L) 32.0 - 36.0 g/dL Final     RDW   Date Value Ref Range Status   08/04/2023 12.9 11.5 - 14.5 % Final     Platelets   Date Value Ref Range Status   08/04/2023 244 150 - 450 x10E9/L Final     Sodium   Date Value Ref Range Status   09/25/2024 140 136 - 145 mmol/L Final     Potassium   Date Value Ref Range Status   09/25/2024 4.3 3.5 - 5.3 mmol/L Final     Bicarbonate   Date Value Ref Range Status   09/25/2024 28 21 - 32 mmol/L Final     Urea Nitrogen   Date Value Ref Range Status   09/25/2024 14 6 - 23 mg/dL Final     Calcium   Date Value Ref Range Status   09/25/2024 9.4 8.6 - 10.6 mg/dL Final     No results found for: \"PROTIME\", " "\"PTT\", \"INR\", \"FIBRINOGEN\"      REVIEW OF RADIOLOGY   I have reviewed the following:  Radiology Studies           XR L-spine 4/8/24:  Moderate multilevel spondylosis, most pronounced at L5-S1.        ASSESSMENT & PLAN  Alvino Brambila is a 49 y.o. male with PMH HTN, morbid obesity, GERD, mild intermittent asthma, NIDDM2, MARITZA on CPAP who presents as new patient referred by Eve Wilder PA-C with LB pain     1) LBP  -Since 2008 with radiation to BL lateral hips assoc with subj weakness  -Refractive to yrs of conservative tx including Tylenol, NSAIDs, lidocaine patch, tizanidine, gabapentin, Cymbalta, >6 w PT (last completed 2023)  -MRI L-spine to eval neuraxial pathology  -MDP in meantime  -F/U 2-4 w                  Monico Bowie MD  Anesthesiologist & Interventional Pain Physician   [facility] [place]  O: [phone]  F: [phone]  9:07 AM  10/09/24    "

## 2024-10-09 NOTE — LETTER
October 9, 2024     Eve Wilder PA-C  [address] [facility] [address]  [place] [address]    Patient: James Brambila   YOB: 1975   Date of Visit: 10/9/2024       Dear Dr. Eve Wilder PA-C:    Thank you for referring James Brambila to me for evaluation. Below are my notes for this consultation.  If you have questions, please do not hesitate to call me. I look forward to following your patient along with you.       Sincerely,     Monico Bowie MD      CC: No Recipients  ______________________________________________________________________________________    PAIN MANAGEMENT NEW PATIENT OFFICE NOTE    Date of Service: 10/9/2024    SUBJECTIVE    CHIEF COMPLAINT: LBP    HISTORY OF PRESENT ILLNESS    Alvino Brambila is a 49 y.o. male with PMH HTN, morbid obesity, GERD, mild intermittent asthma, NIDDM2, MARITZA on CPAP who presents as new patient referred by Eve Wilder PA-C with LB pain.    Pt describes LBP pain since 2008. He feels pain has significantly worsened in last yr.  Pain originates in LB. Pain radiates to BL lateral hips. Pain is worse with activity, standing, walking, prolonged sitting, prolonged laying. Pain is alleviated with laying on side. Pain assoc with BLE weakness most appreciated with climbing stairs and mowing lawn. Pt has tried Tylenol, NSAIDs, lidocaine patch, tizanidine, gabapentin, Cymbalta, >6 w PT (last completed 2023).     Pt denies new-onset numbness, bowel/bladder incontinence.  Pt denies recent infection, allergy to Latex/iodine/contrast. Patient is currently taking the following blood thinner(s): N/A    REVIEW OF SYSTEMS  Review of Systems   Constitutional: Negative.    HENT: Negative.     Eyes: Negative.    Respiratory: Negative.     Cardiovascular: Negative.    Gastrointestinal: Negative.    Endocrine: Negative.    Musculoskeletal:  Positive for back pain.   Skin: Negative.    Neurological:  Positive for weakness. Negative for numbness.   Hematological: Negative.   "  Psychiatric/Behavioral: Negative.         PAST MEDICAL HISTORY  Past Medical History:   Diagnosis Date   • Anxiety    • Depression    • Other conditions influencing health status     DEXA Body Composition Study   • Personal history of other diseases of the nervous system and sense organs     History of acute otitis media   • Personal history of other diseases of the respiratory system     Personal history of asthma   • Personal history of other specified conditions     History of headache     History reviewed. No pertinent surgical history.  Family History   Problem Relation Name Age of Onset   • Hypertension Mother Inocencia Brambila    • Anxiety disorder Mother Inocencia Brambila    • Hyperlipidemia Mother Inocencia Brambila        CURRENT MEDICATIONS  Current Outpatient Medications   Medication Sig Dispense Refill   • busPIRone (Buspar) 30 mg tablet Take 1 tablet (30 mg) by mouth 2 times a day. 180 tablet 1   • lidocaine (Lidoderm) 5 % patch Place 1 patch over 12 hours on the skin once daily. Apply to painful area 12 hours per day, remove for 12 hours. 90 patch 1   • metFORMIN (Glucophage) 1,000 mg tablet TAKE 1 TABLET TWICE A DAY WITH MEALS 60 tablet 11   • olmesartan (BENIcar) 20 mg tablet Take 1 tablet (20 mg) by mouth once daily. 90 tablet 3   • tirzepatide (Mounjaro) 5 mg/0.5 mL pen injector Inject 5 mg under the skin 1 (one) time per week. 6 mL 1   • tiZANidine (Zanaflex) 2 mg tablet Take 1 tablet (2 mg) by mouth 2 times a day as needed for muscle spasms. 180 tablet 1   • albuterol (ProAir HFA) 90 mcg/actuation inhaler Inhale 2 puffs every 6 hours if needed for wheezing or shortness of breath. 8.5 g 3   • mometasone-formoterol (Dulera) 100-5 mcg/actuation inhaler Inhale 1 puff 2 times a day. 8.8 g 1     No current facility-administered medications for this visit.       ALLERGIES AND DRUG REACTIONS  No Known Allergies       OBJECTIVE  Visit Vitals  /86   Pulse 83   Resp 20   Ht 1.803 m (5' 11\")   Wt 143 kg " (315 lb)   BMI 43.93 kg/m²   Smoking Status Never   BSA 2.68 m²       Last Recorded Pain Score (if available):                Physical Exam  Vitals and nursing note reviewed.     General: Sitting in chair, NAD  Head: NCAT  Eyes: Sclera/conjunctiva clear, EOMI, PERRL  Nose/mouth: MMM  CV: Good distal pulses  Lungs: Good/equal chest excursion  Abdomen: Soft, ND  Ext: No cyanosis/edema  MSK: L-spine alignment: unremarkable, BL paraspinal m NTTP, L-spine ROM: flexion lmtd by pain    Neuro: AAOx3, CN grossly nl  Dermatome sensation to light touch  LEFT L1 (lower pelvis/upper thigh): WNL    RIGHT L1: WNL      LEFT L2 (upper thigh): WNL       RIGHT: L2:WNL      LEFT L3 (medial knee): WNL       RIGHT L3: WNL      LEFT L4 (superior medial malleolus): WNL       RIGHT L4: WNL      LEFT L5 (dorsal foot): WNL       RIGHT L5: WNL      LEFT S1 (lateral foot): WNL     RIGHT S1: WNL      LEFT S2 (popliteal fossa): WNL    RIGHT S2: WNL        Motor strength  LEFT L2 (hip flexion): 5/5   RIGHT L2: 5/5  LEFT L3 (knee extension): 5/5     RIGHT L3: 5/5  LEFT L4 (dorsiflexion): 5/5     RIGHT L4: 5/5  LEFT L5 (EHL extension): 5/5     RIGHT L5: 5/5  LEFT S1 (plantarflexion): 5/5     RIGHT S1: 5/5  LEFT S2 (knee flexion): 5/5      RIGHT S2: 5/5    Special testing  DTR unremarkable  Seated slump test +LLE  Clonus: neg BL  Babinski: neg BL    Psych: affect nl  Skin: no rash/lesions      REVIEW OF LABORATORY DATA  I have reviewed the following lab results:  WBC   Date Value Ref Range Status   08/04/2023 7.6 4.4 - 11.3 x10E9/L Final     RBC   Date Value Ref Range Status   08/04/2023 4.92 4.50 - 5.90 x10E12/L Final     Hemoglobin   Date Value Ref Range Status   08/04/2023 13.7 13.5 - 17.5 g/dL Final     Hematocrit   Date Value Ref Range Status   08/04/2023 43.8 41.0 - 52.0 % Final     MCV   Date Value Ref Range Status   08/04/2023 89 80 - 100 fL Final     MCHC   Date Value Ref Range Status   08/04/2023 31.3 (L) 32.0 - 36.0 g/dL Final     RDW  "  Date Value Ref Range Status   08/04/2023 12.9 11.5 - 14.5 % Final     Platelets   Date Value Ref Range Status   08/04/2023 244 150 - 450 x10E9/L Final     Sodium   Date Value Ref Range Status   09/25/2024 140 136 - 145 mmol/L Final     Potassium   Date Value Ref Range Status   09/25/2024 4.3 3.5 - 5.3 mmol/L Final     Bicarbonate   Date Value Ref Range Status   09/25/2024 28 21 - 32 mmol/L Final     Urea Nitrogen   Date Value Ref Range Status   09/25/2024 14 6 - 23 mg/dL Final     Calcium   Date Value Ref Range Status   09/25/2024 9.4 8.6 - 10.6 mg/dL Final     No results found for: \"PROTIME\", \"PTT\", \"INR\", \"FIBRINOGEN\"      REVIEW OF RADIOLOGY   I have reviewed the following:  Radiology Studies           XR L-spine 4/8/24:  Moderate multilevel spondylosis, most pronounced at L5-S1.        ASSESSMENT & PLAN  Alvino Brambila is a 49 y.o. male with PMH HTN, morbid obesity, GERD, mild intermittent asthma, NIDDM2, MARITZA on CPAP who presents as new patient referred by Eve Wilder PA-C with LB pain     1) LBP  -Since 2008 with radiation to BL lateral hips assoc with subj weakness  -Refractive to yrs of conservative tx including Tylenol, NSAIDs, lidocaine patch, tizanidine, gabapentin, Cymbalta, >6 w PT (last completed 2023)  -MRI L-spine to eval neuraxial pathology  -MDP in meantime  -F/U 2-4 w                  Monico Bowie MD  Anesthesiologist & Interventional Pain Physician   [facility] [place]  O: [phone]  F: [phone]  9:07 AM  10/09/24    "

## 2024-10-21 ENCOUNTER — HOSPITAL ENCOUNTER (OUTPATIENT)
Dept: RADIOLOGY | Facility: CLINIC | Age: 49
Discharge: HOME | End: 2024-10-21
Payer: COMMERCIAL

## 2024-10-21 DIAGNOSIS — M54.16 LUMBAR RADICULOPATHY: ICD-10-CM

## 2024-10-21 PROCEDURE — 72148 MRI LUMBAR SPINE W/O DYE: CPT

## 2024-10-21 PROCEDURE — 72148 MRI LUMBAR SPINE W/O DYE: CPT | Performed by: RADIOLOGY

## 2024-11-06 ENCOUNTER — OFFICE VISIT (OUTPATIENT)
Dept: PAIN MEDICINE | Facility: CLINIC | Age: 49
End: 2024-11-06
Payer: COMMERCIAL

## 2024-11-06 VITALS
BODY MASS INDEX: 44.1 KG/M2 | OXYGEN SATURATION: 97 % | HEART RATE: 86 BPM | DIASTOLIC BLOOD PRESSURE: 72 MMHG | WEIGHT: 315 LBS | HEIGHT: 71 IN | RESPIRATION RATE: 16 BRPM | SYSTOLIC BLOOD PRESSURE: 135 MMHG

## 2024-11-06 DIAGNOSIS — M48.062 SPINAL STENOSIS OF LUMBAR REGION WITH NEUROGENIC CLAUDICATION: ICD-10-CM

## 2024-11-06 DIAGNOSIS — M54.41 CHRONIC BILATERAL LOW BACK PAIN WITH BILATERAL SCIATICA: Primary | ICD-10-CM

## 2024-11-06 DIAGNOSIS — M54.42 CHRONIC BILATERAL LOW BACK PAIN WITH BILATERAL SCIATICA: Primary | ICD-10-CM

## 2024-11-06 DIAGNOSIS — G89.29 CHRONIC BILATERAL LOW BACK PAIN WITH BILATERAL SCIATICA: Primary | ICD-10-CM

## 2024-11-06 PROCEDURE — 3075F SYST BP GE 130 - 139MM HG: CPT | Performed by: ANESTHESIOLOGY

## 2024-11-06 PROCEDURE — 99214 OFFICE O/P EST MOD 30 MIN: CPT | Performed by: ANESTHESIOLOGY

## 2024-11-06 PROCEDURE — 3044F HG A1C LEVEL LT 7.0%: CPT | Performed by: ANESTHESIOLOGY

## 2024-11-06 PROCEDURE — G2211 COMPLEX E/M VISIT ADD ON: HCPCS | Performed by: ANESTHESIOLOGY

## 2024-11-06 PROCEDURE — 4010F ACE/ARB THERAPY RXD/TAKEN: CPT | Performed by: ANESTHESIOLOGY

## 2024-11-06 PROCEDURE — 3078F DIAST BP <80 MM HG: CPT | Performed by: ANESTHESIOLOGY

## 2024-11-06 PROCEDURE — 1036F TOBACCO NON-USER: CPT | Performed by: ANESTHESIOLOGY

## 2024-11-06 PROCEDURE — 3048F LDL-C <100 MG/DL: CPT | Performed by: ANESTHESIOLOGY

## 2024-11-06 PROCEDURE — 3061F NEG MICROALBUMINURIA REV: CPT | Performed by: ANESTHESIOLOGY

## 2024-11-06 PROCEDURE — 3008F BODY MASS INDEX DOCD: CPT | Performed by: ANESTHESIOLOGY

## 2024-11-06 ASSESSMENT — ENCOUNTER SYMPTOMS
WEAKNESS: 1
CONSTITUTIONAL NEGATIVE: 1
EYES NEGATIVE: 1
GASTROINTESTINAL NEGATIVE: 1
RESPIRATORY NEGATIVE: 1
CARDIOVASCULAR NEGATIVE: 1
NUMBNESS: 0
BACK PAIN: 1
ENDOCRINE NEGATIVE: 1
PSYCHIATRIC NEGATIVE: 1
HEMATOLOGIC/LYMPHATIC NEGATIVE: 1

## 2024-11-06 ASSESSMENT — PAIN SCALES - GENERAL
PAINLEVEL_OUTOF10: 6
PAINLEVEL_OUTOF10: 6

## 2024-11-06 ASSESSMENT — PAIN - FUNCTIONAL ASSESSMENT: PAIN_FUNCTIONAL_ASSESSMENT: 0-10

## 2024-11-06 NOTE — PROGRESS NOTES
PAIN MANAGEMENT FOLLOW-UP OFFICE NOTE    Date of Service: 11/6/2024    SUBJECTIVE    CHIEF COMPLAINT: LBP    HISTORY OF PRESENT ILLNESS    Alvino Brambila is a 49 y.o. male with PMH HTN, morbid obesity, GERD, mild intermittent asthma, NIDDM2, MARITZA on CPAP who presents for F/U    Since his last visit, pt maintains LBP radiating to BL hips. Completed MRI L-spine and would like to discuss results. Non-adherent to MDP.     Pt denies new-onset numbness, bowel/bladder incontinence.  Pt denies recent infection, allergy to Latex/iodine/contrast. Patient is currently taking the following blood thinner(s): N/A    REVIEW OF SYSTEMS  Review of Systems   Constitutional: Negative.    HENT: Negative.     Eyes: Negative.    Respiratory: Negative.     Cardiovascular: Negative.    Gastrointestinal: Negative.    Endocrine: Negative.    Musculoskeletal:  Positive for back pain.   Skin: Negative.    Neurological:  Positive for weakness. Negative for numbness.   Hematological: Negative.    Psychiatric/Behavioral: Negative.         PAST MEDICAL HISTORY  Past Medical History:   Diagnosis Date    Anxiety     Depression     Other conditions influencing health status     DEXA Body Composition Study    Personal history of other diseases of the nervous system and sense organs     History of acute otitis media    Personal history of other diseases of the respiratory system     Personal history of asthma    Personal history of other specified conditions     History of headache     History reviewed. No pertinent surgical history.  Family History   Problem Relation Name Age of Onset    Hypertension Mother Inocencia Brambila     Anxiety disorder Mother Inocencia Brambila     Hyperlipidemia Mother Inocencia Brambila        CURRENT MEDICATIONS  Current Outpatient Medications   Medication Sig Dispense Refill    albuterol (ProAir HFA) 90 mcg/actuation inhaler Inhale 2 puffs every 6 hours if needed for wheezing or shortness of breath. 8.5 g 3    busPIRone  "(Buspar) 30 mg tablet Take 1 tablet (30 mg) by mouth 2 times a day. 180 tablet 1    lidocaine (Lidoderm) 5 % patch Place 1 patch over 12 hours on the skin once daily. Apply to painful area 12 hours per day, remove for 12 hours. 90 patch 1    metFORMIN (Glucophage) 1,000 mg tablet TAKE 1 TABLET TWICE A DAY WITH MEALS 60 tablet 11    mometasone-formoterol (Dulera) 100-5 mcg/actuation inhaler Inhale 1 puff 2 times a day. 8.8 g 1    olmesartan (BENIcar) 20 mg tablet Take 1 tablet (20 mg) by mouth once daily. 90 tablet 3    tirzepatide (Mounjaro) 5 mg/0.5 mL pen injector Inject 5 mg under the skin 1 (one) time per week. 6 mL 1    tiZANidine (Zanaflex) 2 mg tablet Take 1 tablet (2 mg) by mouth 2 times a day as needed for muscle spasms. 180 tablet 1     No current facility-administered medications for this visit.       ALLERGIES AND DRUG REACTIONS  No Known Allergies       OBJECTIVE  Visit Vitals  /72   Pulse 86   Resp 16   Ht 1.803 m (5' 11\")   Wt 143 kg (315 lb)   SpO2 97%   BMI 43.93 kg/m²   Smoking Status Never   BSA 2.68 m²       Last Recorded Pain Score (if available):                Physical Exam  Vitals and nursing note reviewed.     General: Sitting in chair, NAD  Head: NCAT  Eyes: Sclera/conjunctiva clear, EOMI, PERRL  Nose/mouth: MMM  CV: Good distal pulses  Lungs: Good/equal chest excursion  Abdomen: Soft, ND  Ext: No cyanosis/edema  MSK: L-spine alignment: unremarkable, BL paraspinal m NTTP, L-spine ROM: flexion lmtd by pain    Neuro: AAOx3, CN grossly nl  Dermatome sensation to light touch  LEFT L1 (lower pelvis/upper thigh): WNL    RIGHT L1: WNL      LEFT L2 (upper thigh): WNL       RIGHT: L2:WNL      LEFT L3 (medial knee): WNL       RIGHT L3: WNL      LEFT L4 (superior medial malleolus): WNL       RIGHT L4: WNL      LEFT L5 (dorsal foot): WNL       RIGHT L5: WNL      LEFT S1 (lateral foot): WNL     RIGHT S1: WNL      LEFT S2 (popliteal fossa): WNL    RIGHT S2: WNL        Motor strength  LEFT L2 (hip " "flexion): 5/5   RIGHT L2: 5/5  LEFT L3 (knee extension): 5/5     RIGHT L3: 5/5  LEFT L4 (dorsiflexion): 5/5     RIGHT L4: 5/5  LEFT L5 (EHL extension): 5/5     RIGHT L5: 5/5  LEFT S1 (plantarflexion): 5/5     RIGHT S1: 5/5  LEFT S2 (knee flexion): 5/5      RIGHT S2: 5/5    Special testing  DTR unremarkable  Seated slump test +LLE    Psych: affect nl  Skin: no rash/lesions      REVIEW OF LABORATORY DATA  I have reviewed the following lab results:  WBC   Date Value Ref Range Status   08/04/2023 7.6 4.4 - 11.3 x10E9/L Final     RBC   Date Value Ref Range Status   08/04/2023 4.92 4.50 - 5.90 x10E12/L Final     Hemoglobin   Date Value Ref Range Status   08/04/2023 13.7 13.5 - 17.5 g/dL Final     Hematocrit   Date Value Ref Range Status   08/04/2023 43.8 41.0 - 52.0 % Final     MCV   Date Value Ref Range Status   08/04/2023 89 80 - 100 fL Final     MCHC   Date Value Ref Range Status   08/04/2023 31.3 (L) 32.0 - 36.0 g/dL Final     RDW   Date Value Ref Range Status   08/04/2023 12.9 11.5 - 14.5 % Final     Platelets   Date Value Ref Range Status   08/04/2023 244 150 - 450 x10E9/L Final     Sodium   Date Value Ref Range Status   09/25/2024 140 136 - 145 mmol/L Final     Potassium   Date Value Ref Range Status   09/25/2024 4.3 3.5 - 5.3 mmol/L Final     Bicarbonate   Date Value Ref Range Status   09/25/2024 28 21 - 32 mmol/L Final     Urea Nitrogen   Date Value Ref Range Status   09/25/2024 14 6 - 23 mg/dL Final     Calcium   Date Value Ref Range Status   09/25/2024 9.4 8.6 - 10.6 mg/dL Final     No results found for: \"PROTIME\", \"PTT\", \"INR\", \"FIBRINOGEN\"      REVIEW OF RADIOLOGY   I have reviewed the following:  Radiology Studies           MRI L-spine 10/21/24:    Alignment: The vertebral alignment is maintained. There is  straightening of the normal lumbar lordosis.      Vertebrae/Intervertebral Discs: The vertebral bodies demonstrate  expected height. The marrow signal is within normal limits. There is  decreased disc " signal most prominent at levels L2-L3, L3-L4, L4-L5  and L5-S1 suggestive of degenerative disc disease.      Conus: The lower thoracic cord appears unremarkable. The conus  terminates at L1. There is kinking of the proximal nerve roots  secondary to spinal canal stenosis..      T11-T12: Disc bulge with small right paracentral disc protrusion and  facet arthrosis. Mild spinal canal stenosis. Mild neural foraminal  stenosis.      T12-L1:  There is no significant spinal canal or neural foraminal  stenosis.      L1-2: Disc bulge with a superimposed central disc extrusion and  bilateral facet arthrosis as well as prominent posterior epidural fat  results in moderate to severe spinal canal stenosis, narrowing of the  subarticular recess and mild bilateral neural foraminal stenosis.      L2-3: There is disc bulge, ligamentum flavum hypertrophy and  bilateral facet arthropathy resulting in severe spinal canal  stenosis, increased compared to prior exam. Mild bilateral neural  foraminal stenosis.      L3-4: There is disc bulge and bilateral facet arthropathy resulting  in mild spinal canal stenosis. There is narrowing of the right  subarticular recess. Mild bilateral neural foraminal stenosis.      L4-5: There is disc bulge and facet arthropathy resulting in moderate  bilateral neural foraminal stenosis, increased compared to prior  exam. No significant spinal canal stenosis.      L5-S1: There is disc bulge resulting in moderate bilateral neural  foraminal stenosis. No significant spinal canal stenosis.      The prevertebral and posterior paraspinous soft tissues are  unremarkable.      IMPRESSION:  1. Multilevel degenerative changes most pronounced at L2-L3 with  severe spinal canal stenosis. Neural foraminal narrowing most  pronounced at L4-L5 and L5-S1. Additional degenerative changes as detailed above. Degenerative changes have increased since the prior exam.       ASSESSMENT & PLAN  Alvino Brambila is a 49 y.o. male  with PMH HTN, morbid obesity, GERD, mild intermittent asthma, NIDDM2, MARITZA on CPAP who presents for F/U    1) LBP  -Since 2008 with radiation to BL lateral hips assoc with subj weakness  -Refractive to yrs of conservative tx including Tylenol, NSAIDs, lidocaine patch, tizanidine, gabapentin, Cymbalta, >6 w PT (last completed 2023). Non-adherent to MDP  -Reviewed/discussed MRI L-spine 10/21/24: Multilevel degenerative changes most pronounced at L2-L3 with  severe spinal canal stenosis. Neural foraminal narrowing most  pronounced at L4-L5 and L5-S1. Degenerative changes have increased since the prior exam.  -Schedule BL L2-3 TFESI to target pain generator as seen on imaging and minimize risk/likelihood of chronic opioid use and/or surgery  -Consider BL l5-S1 TFESi if refractive        Discussed procedure risks/benefits in detail with patient. Pt meets medical necessity for procedure due to failure of conservative measures. Reviewed procedural risks including bleeding, infection, nerve damage, paralysis. Also reviewed mitigating factors such as screening for infection/blood thinner use, sterile precautions, and image-guidance when applicable. All questions answered. Pt/guardian expressed understanding and choose to proceed                   Monico Bowie MD  Anesthesiologist & Interventional Pain Physician   [facility] [place]  O: [phone]  F: [phone]  8:09 AM  11/06/24

## 2024-11-11 ENCOUNTER — APPOINTMENT (OUTPATIENT)
Dept: SLEEP MEDICINE | Facility: CLINIC | Age: 49
End: 2024-11-11
Payer: COMMERCIAL

## 2024-11-11 VITALS
HEIGHT: 71 IN | OXYGEN SATURATION: 98 % | DIASTOLIC BLOOD PRESSURE: 78 MMHG | WEIGHT: 306 LBS | SYSTOLIC BLOOD PRESSURE: 146 MMHG | HEART RATE: 88 BPM | BODY MASS INDEX: 42.84 KG/M2

## 2024-11-11 DIAGNOSIS — K21.9 GASTROESOPHAGEAL REFLUX DISEASE, UNSPECIFIED WHETHER ESOPHAGITIS PRESENT: ICD-10-CM

## 2024-11-11 DIAGNOSIS — G47.33 OSA ON CPAP: Primary | ICD-10-CM

## 2024-11-11 PROCEDURE — 99214 OFFICE O/P EST MOD 30 MIN: CPT | Performed by: INTERNAL MEDICINE

## 2024-11-11 PROCEDURE — 3077F SYST BP >= 140 MM HG: CPT | Performed by: INTERNAL MEDICINE

## 2024-11-11 PROCEDURE — 3078F DIAST BP <80 MM HG: CPT | Performed by: INTERNAL MEDICINE

## 2024-11-11 PROCEDURE — 3048F LDL-C <100 MG/DL: CPT | Performed by: INTERNAL MEDICINE

## 2024-11-11 PROCEDURE — 3008F BODY MASS INDEX DOCD: CPT | Performed by: INTERNAL MEDICINE

## 2024-11-11 PROCEDURE — 1036F TOBACCO NON-USER: CPT | Performed by: INTERNAL MEDICINE

## 2024-11-11 PROCEDURE — 3044F HG A1C LEVEL LT 7.0%: CPT | Performed by: INTERNAL MEDICINE

## 2024-11-11 PROCEDURE — 3061F NEG MICROALBUMINURIA REV: CPT | Performed by: INTERNAL MEDICINE

## 2024-11-11 PROCEDURE — 4010F ACE/ARB THERAPY RXD/TAKEN: CPT | Performed by: INTERNAL MEDICINE

## 2024-11-11 RX ORDER — OMEPRAZOLE 10 MG/1
10 CAPSULE, DELAYED RELEASE ORAL
COMMUNITY

## 2024-11-11 ASSESSMENT — SLEEP AND FATIGUE QUESTIONNAIRES
HOW LIKELY ARE YOU TO NOD OFF OR FALL ASLEEP WHILE SITTING AND TALKING TO SOMEONE: WOULD NEVER DOZE
ESS-CHAD TOTAL SCORE: 11
HOW LIKELY ARE YOU TO NOD OFF OR FALL ASLEEP WHEN YOU ARE A PASSENGER IN A CAR FOR AN HOUR WITHOUT A BREAK: MODERATE CHANCE OF DOZING
HOW LIKELY ARE YOU TO NOD OFF OR FALL ASLEEP WHILE LYING DOWN TO REST IN THE AFTERNOON WHEN CIRCUMSTANCES PERMIT: MODERATE CHANCE OF DOZING
HOW LIKELY ARE YOU TO NOD OFF OR FALL ASLEEP IN A CAR, WHILE STOPPED FOR A FEW MINUTES IN TRAFFIC: WOULD NEVER DOZE
SITING INACTIVE IN A PUBLIC PLACE LIKE A CLASS ROOM OR A MOVIE THEATER: SLIGHT CHANCE OF DOZING
HOW LIKELY ARE YOU TO NOD OFF OR FALL ASLEEP WHILE SITTING QUIETLY AFTER LUNCH WITHOUT ALCOHOL: MODERATE CHANCE OF DOZING
HOW LIKELY ARE YOU TO NOD OFF OR FALL ASLEEP WHILE SITTING AND READING: MODERATE CHANCE OF DOZING
HOW LIKELY ARE YOU TO NOD OFF OR FALL ASLEEP WHILE WATCHING TV: MODERATE CHANCE OF DOZING

## 2024-11-11 ASSESSMENT — PAIN SCALES - GENERAL: PAINLEVEL_OUTOF10: 0-NO PAIN

## 2024-11-11 NOTE — PROGRESS NOTES
"          Patient: Alvino Brambila    [id number]  : 1975 -- AGE 49 y.o.    Provider: Kem Natarajan MD     Location [facility]   Service Date: 2024              [facility]  Followup Visit Note      HISTORY OF PRESENT ILLNESS     The patient's referring provider is: No ref. provider found    HISTORY OF PRESENT ILLNESS   Alvino Brambila \"Suma" is a 49 y.o. male who presents to a [facility] for followup.     The patient  has a past medical history of Anxiety, Depression, Other conditions influencing health status, Personal history of other diseases of the nervous system and sense organs, Personal history of other diseases of the respiratory system, and Personal history of other specified conditions..    CURRENT HISTORY    On today's visit, the patient reports Sleep Apnea, Pap Adherence Followup, and Needs Pap Supplies/new Pap Machine  He is here to review his sleep diaries he brought his diaries  The patient's current CPAP is broken beyond repair. Alvino Brambila \"Suma" needs a replacement with remote monitoring capabilities.   Sleep Schedule: Review sleep diaries indicates He goes to bed at 10: 30-11:00 p.m. and his sleep latency is 30 minutes. He wakes by 530 to 6:30 AM on working days, 7-7 30 a.m nonworking days.. He has 1-2 awakenings per 2-3 times a week that last for 30 minutes. He gets about 6.5-7 hours of sleep per night.  He naps 3-4 times a week for 15 to 30 minutes    PAP Adherence:  DURABLE MEDICAL EQUIPMENT COMPANY: [employer]  Machine: THERAPY: RESMED AIRSENSE 10 CPAP PRESSURE SETTINGS: 10 cm H2O   Issues with therapy: ISSUES WITH THERAPY: he is waking up unfrefreshed , early morning awakenings      RLS Followup:   SPPRLSFUSX: none.    ESS: 11     REVIEW OF SYSTEMS     REVIEW OF SYSTEMS  Review of Systems      ALLERGIES AND MEDICATIONS     ALLERGIES  No Known Allergies    MEDICATIONS: He has a " "current medication list which includes the following prescription(s): buspirone - Take 1 tablet (30 mg) by mouth 2 times a day, lidocaine - Place 1 patch over 12 hours on the skin once daily. Apply to painful area 12 hours per day, remove for 12 hours, metformin - TAKE 1 TABLET TWICE A DAY WITH MEALS, olmesartan - Take 1 tablet (20 mg) by mouth once daily, mounjaro - Inject 5 mg under the skin 1 (one) time per week, tizanidine - Take 1 tablet (2 mg) by mouth 2 times a day as needed for muscle spasms, albuterol - Inhale 2 puffs every 6 hours if needed for wheezing or shortness of breath, dulera - Inhale 1 puff 2 times a day, and omeprazole - Take 1 capsule (10 mg) by mouth. Do not crush or chew.    PAST MEDICAL HISTORY : He  has a past medical history of Anxiety, Depression, Other conditions influencing health status, Personal history of other diseases of the nervous system and sense organs, Personal history of other diseases of the respiratory system, and Personal history of other specified conditions.    PAST SURGICAL HISTORY: He  has no past surgical history on file.     FAMILY HISTORY: No changes since previous visit. Otherwise non-contributory as charted.       SOCIAL HISTORY  He  reports that he has never smoked. He has never used smokeless tobacco. He reports that he does not currently use alcohol. He reports that he does not use drugs.       PHYSICAL EXAM     VITAL SIGNS: /78   Pulse 88   Ht 1.803 m (5' 11\")   Wt 139 kg (306 lb)   SpO2 98%   BMI 42.68 kg/m²      CURRENT WEIGHT: [unfilled]  BMI: [unfilled]  PREVIOUS WEIGHTS:  Wt Readings from Last 3 Encounters:   11/11/24 139 kg (306 lb)   11/06/24 143 kg (315 lb)   10/09/24 143 kg (315 lb)       Physical Exam  PHYSICAL EXAM: GENERAL: alert pleasant and cooperative no acute distress  PSYCH EXAM: alert,oriented, in NAD with a full range of affect, normal behavior and no psychotic features      RESULTS/DATA     No results found for: \"IRON\", \"TRANSFERRIN\", " "\"IRONSAT\", \"TIBC\", \"FERRITIN\"    PAP Adherence  A PAP adherence download was obtained and data was reviewed personally today in clinic. (see scanned document in EPIC)  COMPLIANCE REPORT RESULTS: Days used: 30/30 > 4 hours usage:  90 % Average usage hours on dates used: 6 hours Residual AHI 0.6      ASSESSMENT/PLAN     Mr. Brambila is a 49 y.o. male and  has a past medical history of Anxiety, Depression, Other conditions influencing health status, Personal history of other diseases of the nervous system and sense organs, Personal history of other diseases of the respiratory system, and Personal history of other specified conditions. He returns in followup to the [facility] for their Sleep Apnea, Pap Adherence Followup, and Needs Pap Supplies/new Pap Machine     He gets reflux and makes it difficult to get back to sleep.    Problem List and Orders  Problem List Items Addressed This Visit             ICD-10-CM    Gastroesophageal reflux disease K21.9     He is on medication. Exacerbated by prone sleep         MARITZA on CPAP - Primary G47.33     Alvino Shankar"James\"   has sleep apnea and requires treatment.  Alvino Shankar"James\" reports that his equipment is not working properly.  Alvino Shankar"James\" 's current CPAP is broken beyond repair.  Motor has exceeded life expectancy  Alvino Shankar"James\" demonstrates previous good compliance and benefit from PAP therapy  - Start  Auto- PAP 6-12 cm H20 through [employer].    Alvino Thornton\" is a REPAP and needs a replacement with remote monitoring capabilities.  Will order sleep study if deemed necessary and order PAP therapy through [employer], and bring him back for 31-90 day compliance  If sleep study is not required, then we will order PAP therapy through [employer],  and bring him back for 31-90 day compliance          Relevant Orders    Positive Airway Pressure (PAP) Therapy          "

## 2024-11-11 NOTE — ASSESSMENT & PLAN NOTE
"Alvino Louis"   has sleep apnea and requires treatment.  Alvino Thornton\" reports that his equipment is not working properly.  Alvino Louis" 's current CPAP is broken beyond repair.  Motor has exceeded life expectancy  Alvino Louis" demonstrates previous good compliance and benefit from PAP therapy  - Start  Auto- PAP 6-12 cm H20 through [employer] [facility]    Alvino Louis" is a REPAP and needs a replacement with remote monitoring capabilities.  Will order sleep study if deemed necessary and order PAP therapy through [employer] [facility] and bring him back for 31-90 day compliance  If sleep study is not required, then we will order PAP therapy through [employer] ([employer]  and bring him back for 31-90 day compliance   "

## 2024-11-13 ENCOUNTER — APPOINTMENT (OUTPATIENT)
Dept: SLEEP MEDICINE | Facility: CLINIC | Age: 49
End: 2024-11-13
Payer: COMMERCIAL

## 2024-11-13 ENCOUNTER — PATIENT MESSAGE (OUTPATIENT)
Dept: PRIMARY CARE | Facility: CLINIC | Age: 49
End: 2024-11-13

## 2024-11-13 DIAGNOSIS — M54.50 CHRONIC BILATERAL LOW BACK PAIN WITHOUT SCIATICA: ICD-10-CM

## 2024-11-13 DIAGNOSIS — G89.29 CHRONIC BILATERAL LOW BACK PAIN WITHOUT SCIATICA: ICD-10-CM

## 2024-11-13 RX ORDER — LIDOCAINE 50 MG/G
PATCH TOPICAL
Qty: 90 PATCH | Refills: 3 | OUTPATIENT
Start: 2024-11-13

## 2024-11-13 RX ORDER — LIDOCAINE 50 MG/G
1 PATCH TOPICAL DAILY
Qty: 90 PATCH | Refills: 1 | Status: SHIPPED | OUTPATIENT
Start: 2024-11-13 | End: 2025-11-13

## 2024-11-14 ENCOUNTER — TELEPHONE (OUTPATIENT)
Dept: SLEEP MEDICINE | Facility: CLINIC | Age: 49
End: 2024-11-14
Payer: COMMERCIAL

## 2024-11-14 DIAGNOSIS — G47.33 OBSTRUCTIVE SLEEP APNEA (ADULT) (PEDIATRIC): Primary | ICD-10-CM

## 2024-11-14 NOTE — TELEPHONE ENCOUNTER
----- Message from Jazmin MUNOZ sent at 11/14/2024  2:45 PM EST -----  Regarding: CPAP prescription  James would like to continue to go through [facility] for all of his CPAP and supply so can you update his prescription for [facility].     Thanks,   Jazmin

## 2024-11-15 ENCOUNTER — TELEPHONE (OUTPATIENT)
Dept: PAIN MEDICINE | Facility: CLINIC | Age: 49
End: 2024-11-15
Payer: COMMERCIAL

## 2024-11-19 ENCOUNTER — PATIENT MESSAGE (OUTPATIENT)
Dept: PRIMARY CARE | Facility: CLINIC | Age: 49
End: 2024-11-19
Payer: COMMERCIAL

## 2024-11-19 DIAGNOSIS — J45.909 ASTHMA, UNSPECIFIED ASTHMA SEVERITY, UNSPECIFIED WHETHER COMPLICATED, UNSPECIFIED WHETHER PERSISTENT (HHS-HCC): ICD-10-CM

## 2024-11-19 RX ORDER — MOMETASONE FUROATE AND FORMOTEROL FUMARATE DIHYDRATE 100; 5 UG/1; UG/1
1 AEROSOL RESPIRATORY (INHALATION)
Qty: 26.5 G | Refills: 1 | Status: SHIPPED | OUTPATIENT
Start: 2024-11-19 | End: 2024-11-22 | Stop reason: SDUPTHER

## 2024-11-19 RX ORDER — MOMETASONE FUROATE AND FORMOTEROL FUMARATE DIHYDRATE 100; 5 UG/1; UG/1
1 AEROSOL RESPIRATORY (INHALATION)
Qty: 8.8 G | Refills: 3 | Status: SHIPPED | OUTPATIENT
Start: 2024-11-19 | End: 2024-11-19

## 2024-11-22 RX ORDER — MOMETASONE FUROATE AND FORMOTEROL FUMARATE DIHYDRATE 100; 5 UG/1; UG/1
2 AEROSOL RESPIRATORY (INHALATION)
Qty: 53 G | Refills: 1 | Status: SHIPPED | OUTPATIENT
Start: 2024-11-22 | End: 2025-05-22

## 2024-11-26 ENCOUNTER — ANCILLARY PROCEDURE (OUTPATIENT)
Dept: RADIOLOGY | Facility: EXTERNAL LOCATION | Age: 49
End: 2024-11-26
Payer: COMMERCIAL

## 2024-11-26 DIAGNOSIS — M48.062 SPINAL STENOSIS, LUMBAR REGION WITH NEUROGENIC CLAUDICATION: ICD-10-CM

## 2024-11-26 PROCEDURE — 64483 NJX AA&/STRD TFRM EPI L/S 1: CPT | Performed by: ANESTHESIOLOGY

## 2024-11-26 NOTE — PROGRESS NOTES
OPERATIVE NOTE  Preprocedure diagnosis: Lumbar spinal stenosis  Postprocedure diagnosis Lumbar spinal stenosis    Procedure performed: BL L2-3 TFESI  Physician: Monico Bowie MD  Anesthesia: Local  Complications: none  Blood loss:  Nil    Clinical note: This is a very pleasant 49 y.o. male with PMH HTN, morbid obesity, GERD, mild intermittent asthma, NIDDM2, MARITZA on CPAP who suffers with LBP here meeting all medical criteria for above-mentioned procedure. Patient's pain stable and persistent from last visit.   Denies allergies to Latex, steroids, local anesthetics, or iodine/contrast. Denies being on blood thinners. .  Denies fever, chills, NS, CP, SOB, cough, N/V.    Discussed procedure risks/benefits in detail with patient. Pt meets medical necessity for procedure due to failure of conservative measures. Reviewed procedural risks including bleeding, infection, nerve damage, paralysis. Also reviewed mitigating factors such as screening for infection/blood thinner use, sterile precautions, and image-guidance when applicable. All questions answered. Pt/guardian expressed understanding and choose to proceed      PROCEDURE  Bilateral L2-3 transforaminal epidural steroid injection    LOCATION: [facility]  Anesthesia: Local    Informed consent completed.  Patient brought to procedure suite and positioned on table prone.  Lower back prepped with chlorhexidine and draped in sterile fashion.  Fluoroscopy utilized to identify L2-3 level.  C-arm was obliqued to left side to visualize L2-3 foramen.  1.5 inch 25-gauge needle used to administer 1% lidocaine for local anesthesia.  Then, 3.5 inch 22-gauge needle were advanced coaxially to superolateral aspect of foramina via x-ray guidance.  AP view utilized to visualize needles approaching junction of the transverse process and vertebral body.  Lateral view taken to visualize needles just anterior to posterior spinal column.  Needle was then advanced slightly and  after negative add aspiration, 1.5 cc Omnipaque 240 administered at each level revealing adequate epidural spread without vascular uptake.  AP view taken to confirm epidural spread medially without vascular uptake.     After negative aspiration, 7.5 mg dexamethasone +1.25  cc preservative-free saline injected.      Procedure was then repeated in similar fashion on the right side without issue.       Total Injectate: 15 mg dexamethasone + 2.5 ml PF NS    Needles re-styleted and removed.  Hemostasis achieved.  Band-Aids placed.      Sedation: N/A    Patient tolerated procedure well and without issue.  They were discharged home in stable condition.      Monico Bowie MD  Anesthesiologist & Interventional Pain Physician   [facility]  O: [phone]  F: [phone]  10:18 AM  11/26/24

## 2025-01-01 ASSESSMENT — PROMIS GLOBAL HEALTH SCALE
CARRYOUT_SOCIAL_ACTIVITIES: GOOD
RATE_PHYSICAL_HEALTH: POOR
RATE_AVERAGE_FATIGUE: MODERATE
RATE_MENTAL_HEALTH: FAIR
EMOTIONAL_PROBLEMS: OFTEN
CARRYOUT_PHYSICAL_ACTIVITIES: MOSTLY
RATE_AVERAGE_PAIN: 5
RATE_SOCIAL_SATISFACTION: FAIR
RATE_GENERAL_HEALTH: GOOD
RATE_QUALITY_OF_LIFE: GOOD

## 2025-01-03 ENCOUNTER — OFFICE VISIT (OUTPATIENT)
Dept: PRIMARY CARE | Facility: CLINIC | Age: 50
End: 2025-01-03
Payer: COMMERCIAL

## 2025-01-03 VITALS
SYSTOLIC BLOOD PRESSURE: 144 MMHG | HEART RATE: 79 BPM | WEIGHT: 309.2 LBS | OXYGEN SATURATION: 98 % | DIASTOLIC BLOOD PRESSURE: 92 MMHG | BODY MASS INDEX: 43.12 KG/M2

## 2025-01-03 DIAGNOSIS — G47.33 OSA ON CPAP: ICD-10-CM

## 2025-01-03 DIAGNOSIS — F41.8 DEPRESSION WITH ANXIETY: ICD-10-CM

## 2025-01-03 DIAGNOSIS — I10 HYPERTENSION, ESSENTIAL: ICD-10-CM

## 2025-01-03 DIAGNOSIS — Z00.00 ANNUAL PHYSICAL EXAM: Primary | ICD-10-CM

## 2025-01-03 DIAGNOSIS — F41.9 ANXIETY: ICD-10-CM

## 2025-01-03 DIAGNOSIS — E11.9 TYPE 2 DIABETES MELLITUS WITHOUT COMPLICATION, WITHOUT LONG-TERM CURRENT USE OF INSULIN (MULTI): ICD-10-CM

## 2025-01-03 DIAGNOSIS — E78.49 HYPERLIPIDEMIA, FAMILIAL, HIGH LDL: ICD-10-CM

## 2025-01-03 DIAGNOSIS — J45.40 MODERATE PERSISTENT ASTHMA WITHOUT COMPLICATION (HHS-HCC): ICD-10-CM

## 2025-01-03 PROBLEM — G47.19 EXCESSIVE DAYTIME SLEEPINESS: Status: RESOLVED | Noted: 2023-07-16 | Resolved: 2025-01-03

## 2025-01-03 PROBLEM — F51.12 INSUFFICIENT SLEEP SYNDROME: Status: RESOLVED | Noted: 2024-10-07 | Resolved: 2025-01-03

## 2025-01-03 LAB — POC HEMOGLOBIN A1C: 6.2 % (ref 4.2–6.5)

## 2025-01-03 PROCEDURE — 3077F SYST BP >= 140 MM HG: CPT

## 2025-01-03 PROCEDURE — 99396 PREV VISIT EST AGE 40-64: CPT

## 2025-01-03 PROCEDURE — 1036F TOBACCO NON-USER: CPT

## 2025-01-03 PROCEDURE — 83036 HEMOGLOBIN GLYCOSYLATED A1C: CPT

## 2025-01-03 PROCEDURE — 4010F ACE/ARB THERAPY RXD/TAKEN: CPT

## 2025-01-03 PROCEDURE — 3079F DIAST BP 80-89 MM HG: CPT

## 2025-01-03 PROCEDURE — 99214 OFFICE O/P EST MOD 30 MIN: CPT

## 2025-01-03 RX ORDER — BUPROPION HYDROCHLORIDE 150 MG/1
150 TABLET ORAL EVERY MORNING
Qty: 30 TABLET | Refills: 1 | Status: SHIPPED | OUTPATIENT
Start: 2025-01-03 | End: 2025-03-04

## 2025-01-03 RX ORDER — BUSPIRONE HYDROCHLORIDE 10 MG/1
10 TABLET ORAL 2 TIMES DAILY
Qty: 180 TABLET | Refills: 1 | Status: SHIPPED | OUTPATIENT
Start: 2025-01-03

## 2025-01-03 RX ORDER — HYDROCHLOROTHIAZIDE 12.5 MG/1
12.5 TABLET ORAL DAILY
Qty: 30 TABLET | Refills: 1 | Status: SHIPPED | OUTPATIENT
Start: 2025-01-03 | End: 2025-03-04

## 2025-01-03 ASSESSMENT — ENCOUNTER SYMPTOMS
SORE THROAT: 0
BACK PAIN: 1
COUGH: 0
NAUSEA: 1
FATIGUE: 0
DYSURIA: 0
DECREASED CONCENTRATION: 1
DIAPHORESIS: 0
DIARRHEA: 1
SHORTNESS OF BREATH: 1
DIFFICULTY URINATING: 0
ADENOPATHY: 0
LIGHT-HEADEDNESS: 0
BLOOD IN STOOL: 0
PALPITATIONS: 0
FEVER: 0
HEMATURIA: 0
NERVOUS/ANXIOUS: 1
DYSPHORIC MOOD: 1
VOMITING: 0

## 2025-01-03 ASSESSMENT — COLUMBIA-SUICIDE SEVERITY RATING SCALE - C-SSRS
1. IN THE PAST MONTH, HAVE YOU WISHED YOU WERE DEAD OR WISHED YOU COULD GO TO SLEEP AND NOT WAKE UP?: NO
2. HAVE YOU ACTUALLY HAD ANY THOUGHTS OF KILLING YOURSELF?: NO
6. HAVE YOU EVER DONE ANYTHING, STARTED TO DO ANYTHING, OR PREPARED TO DO ANYTHING TO END YOUR LIFE?: NO

## 2025-01-03 ASSESSMENT — PAIN SCALES - GENERAL: PAINLEVEL_OUTOF10: 2

## 2025-01-03 NOTE — PROGRESS NOTES
Subjective   Patient ID: James Brambila is a 49 y.o. male who presents for Annual Exam (Pt is here physical / nr).    Hx of NIDDM, HTN, GERD, Asthma, MARITZA (on CPAP)    Other providers: Dr. Kem Natarajan (Sleep specialist), Dr. Bowie (PM)    DM: controlled. Last A1C 6.6 (9/26/24)-->6.2 (1/4/25). Increased to 5 mg Mounjaro (some diarrhea). Also on 1000 mg Metformin BID. Previously stopped. GI upset of Ozempic. Blood sugars in the morning is 120 -150. Last microalbumin normal (9/26/24) and GFR 88 (9/2024). On ARB yes. Not on statin but CT calcium score was 0 (4/17/24). Last eye exam 7/2024 with Ophthalmologist and foot exam today 9/26/24. PNA UTD today. Patient goal is too lose more weight.. Interested in DM educator. Denies hypoglycemic incidents, polyphagia, polyuria, polydipsia.    Anxiety: Last visit increased dose to 20 mg BID, noticed more GI upset on higher dose, wants to go back down. Has tried Effexor, did not like feeling on. Patient has also tried and failed Prozac (sweating), lexapro (zombie feeling), and Zoloft (weight gain) as well. Patient feels has low motivation and some mild depression with brain fog. Denies SI.     HTN: elevated. On 20 mg Olmesartan. No medication side effects. Home BP, not checking. +SOB (but feels could be from anxiety) and mild leg edema. Denies chest pain, heart palpitations, dizziness, headaches, changes of vision, syncope.    Asthma: using Dulera daily and albuterol prn. No exacerbations in the last 6 months. Patient did not like Advair.     MARITZA: co-managed by sleep specialist and received new CPAP and sleep is improving.     HM: Colon screening colonoscopy 1/3/2023 repeat 7 years. PSA discuss at 55. Lung screening never smoker. Vaccinations Tdap 2022 UTD, Prevnar 20 UTD, declined flu           Review of Systems   Constitutional:  Negative for diaphoresis, fatigue and fever.   HENT:  Negative for congestion, hearing loss and sore throat.    Eyes:  Negative for visual  disturbance.   Respiratory:  Positive for shortness of breath. Negative for cough.    Cardiovascular:  Positive for leg swelling. Negative for chest pain and palpitations.   Gastrointestinal:  Positive for diarrhea and nausea. Negative for blood in stool and vomiting.   Genitourinary:  Negative for difficulty urinating, dysuria and hematuria.   Musculoskeletal:  Positive for back pain.   Skin:  Negative for rash.   Allergic/Immunologic: Negative for immunocompromised state.   Neurological:  Negative for syncope and light-headedness.   Hematological:  Negative for adenopathy.   Psychiatric/Behavioral:  Positive for decreased concentration and dysphoric mood. Negative for suicidal ideas. The patient is nervous/anxious.        Objective   BP (!) 144/92   Pulse 79   Wt 140 kg (309 lb 3.2 oz)   SpO2 98%   BMI 43.12 kg/m²     Physical Exam  Constitutional:       General: He is not in acute distress.     Appearance: Normal appearance.   HENT:      Right Ear: Tympanic membrane and ear canal normal.      Left Ear: Tympanic membrane and ear canal normal.      Nose: Nose normal.      Mouth/Throat:      Mouth: Mucous membranes are moist.      Pharynx: Oropharynx is clear. No oropharyngeal exudate or posterior oropharyngeal erythema.   Eyes:      Extraocular Movements: Extraocular movements intact.      Conjunctiva/sclera: Conjunctivae normal.      Pupils: Pupils are equal, round, and reactive to light.   Neck:      Thyroid: No thyroid mass or thyromegaly.   Cardiovascular:      Rate and Rhythm: Normal rate and regular rhythm.      Pulses: Normal pulses.      Heart sounds: No murmur heard.     No gallop.      Comments: Trace pitting edema  Pulmonary:      Effort: Pulmonary effort is normal.      Breath sounds: No wheezing, rhonchi or rales.   Abdominal:      General: Bowel sounds are normal.      Palpations: Abdomen is soft. There is no hepatomegaly, splenomegaly or mass.      Tenderness: There is no abdominal tenderness.  There is no guarding.   Musculoskeletal:         General: Normal range of motion.      Right lower leg: Pitting Edema present.      Left lower leg: Pitting Edema present.   Lymphadenopathy:      Cervical: No cervical adenopathy.   Skin:     General: Skin is warm.      Findings: No rash.   Neurological:      General: No focal deficit present.      Mental Status: He is alert.      Motor: Motor function is intact. No weakness.   Psychiatric:         Mood and Affect: Mood normal.         Thought Content: Thought content normal.         Assessment/Plan   Diagnoses and all orders for this visit:  Annual physical exam  Type 2 diabetes mellitus without complication, without long-term current use of insulin (Multi)  -     POCT glycosylated hemoglobin (Hb A1C) manually resulted  Depression with anxiety  -     busPIRone (Buspar) 10 mg tablet; Take 1 tablet (10 mg) by mouth 2 times a day.  -     buPROPion XL (Wellbutrin XL) 150 mg 24 hr tablet; Take 1 tablet (150 mg) by mouth once daily in the morning. Do not crush, chew, or split.  Anxiety  -     busPIRone (Buspar) 10 mg tablet; Take 1 tablet (10 mg) by mouth 2 times a day.  Hypertension, essential  -     hydroCHLOROthiazide (Microzide) 12.5 mg tablet; Take 1 tablet (12.5 mg) by mouth once daily.  MARITZA on CPAP  Hyperlipidemia, familial, high LDL  Moderate persistent asthma without complication (HHS-HCC)  Take hydrochlorothiazide for one week, and then if tolerating can start Wellbutrin as well. Decrease back down to 10 mg Buspar BID. Follow-up one month. For HTN/depression.

## 2025-01-25 DIAGNOSIS — I10 HYPERTENSION, ESSENTIAL: ICD-10-CM

## 2025-01-27 RX ORDER — HYDROCHLOROTHIAZIDE 12.5 MG/1
12.5 TABLET ORAL DAILY
Qty: 90 TABLET | Refills: 0 | Status: SHIPPED | OUTPATIENT
Start: 2025-01-27

## 2025-01-31 ASSESSMENT — ENCOUNTER SYMPTOMS
HYPERTENSION: 1
PND: 0
NECK PAIN: 0
SWEATS: 0
BLURRED VISION: 0
HEADACHES: 0
SHORTNESS OF BREATH: 0
PALPITATIONS: 0
ORTHOPNEA: 0

## 2025-02-06 DIAGNOSIS — E11.9 TYPE 2 DIABETES MELLITUS WITHOUT COMPLICATION, WITHOUT LONG-TERM CURRENT USE OF INSULIN (MULTI): ICD-10-CM

## 2025-02-07 ENCOUNTER — OFFICE VISIT (OUTPATIENT)
Dept: PRIMARY CARE | Facility: CLINIC | Age: 50
End: 2025-02-07
Payer: COMMERCIAL

## 2025-02-07 VITALS
HEIGHT: 71 IN | DIASTOLIC BLOOD PRESSURE: 76 MMHG | SYSTOLIC BLOOD PRESSURE: 132 MMHG | HEART RATE: 81 BPM | BODY MASS INDEX: 42.28 KG/M2 | OXYGEN SATURATION: 99 % | WEIGHT: 302 LBS

## 2025-02-07 DIAGNOSIS — I10 HYPERTENSION, ESSENTIAL: Primary | ICD-10-CM

## 2025-02-07 DIAGNOSIS — M54.50 CHRONIC BILATERAL LOW BACK PAIN WITHOUT SCIATICA: ICD-10-CM

## 2025-02-07 DIAGNOSIS — G89.29 CHRONIC BILATERAL LOW BACK PAIN WITHOUT SCIATICA: ICD-10-CM

## 2025-02-07 DIAGNOSIS — E11.9 TYPE 2 DIABETES MELLITUS WITHOUT COMPLICATION, WITHOUT LONG-TERM CURRENT USE OF INSULIN (MULTI): ICD-10-CM

## 2025-02-07 DIAGNOSIS — F41.9 ANXIETY: ICD-10-CM

## 2025-02-07 DIAGNOSIS — F41.8 DEPRESSION WITH ANXIETY: ICD-10-CM

## 2025-02-07 PROCEDURE — 3075F SYST BP GE 130 - 139MM HG: CPT

## 2025-02-07 PROCEDURE — 1036F TOBACCO NON-USER: CPT

## 2025-02-07 PROCEDURE — 3008F BODY MASS INDEX DOCD: CPT

## 2025-02-07 PROCEDURE — 99214 OFFICE O/P EST MOD 30 MIN: CPT

## 2025-02-07 PROCEDURE — 3078F DIAST BP <80 MM HG: CPT

## 2025-02-07 RX ORDER — METFORMIN HYDROCHLORIDE 1000 MG/1
1000 TABLET ORAL
Qty: 60 TABLET | Refills: 11 | OUTPATIENT
Start: 2025-02-07

## 2025-02-07 RX ORDER — ESCITALOPRAM OXALATE 10 MG/1
10 TABLET ORAL DAILY
Qty: 90 TABLET | Refills: 1 | Status: SHIPPED | OUTPATIENT
Start: 2025-02-07 | End: 2025-08-06

## 2025-02-07 RX ORDER — OLMESARTAN MEDOXOMIL AND HYDROCHLOROTHIAZIDE 20/12.5 20; 12.5 MG/1; MG/1
1 TABLET ORAL DAILY
Qty: 90 TABLET | Refills: 1 | Status: SHIPPED | OUTPATIENT
Start: 2025-02-07 | End: 2026-02-07

## 2025-02-07 RX ORDER — ESCITALOPRAM OXALATE 5 MG/1
5 TABLET ORAL DAILY
Qty: 30 TABLET | Refills: 0 | Status: SHIPPED | OUTPATIENT
Start: 2025-02-07 | End: 2025-05-08

## 2025-02-07 RX ORDER — METFORMIN HYDROCHLORIDE 1000 MG/1
1000 TABLET ORAL DAILY
Qty: 90 TABLET | Refills: 1 | Status: SHIPPED | OUTPATIENT
Start: 2025-02-07

## 2025-02-07 RX ORDER — BUSPIRONE HYDROCHLORIDE 10 MG/1
10 TABLET ORAL 2 TIMES DAILY
Qty: 180 TABLET | Refills: 1 | Status: SHIPPED | OUTPATIENT
Start: 2025-02-07

## 2025-02-07 RX ORDER — TIZANIDINE 2 MG/1
2 TABLET ORAL 2 TIMES DAILY PRN
Qty: 180 TABLET | Refills: 1 | Status: SHIPPED | OUTPATIENT
Start: 2025-02-07 | End: 2026-02-07

## 2025-02-07 ASSESSMENT — ENCOUNTER SYMPTOMS
DYSPHORIC MOOD: 1
FATIGUE: 0
LIGHT-HEADEDNESS: 0
DIZZINESS: 0
DIARRHEA: 1
VOMITING: 0
PALPITATIONS: 0
NAUSEA: 1
SHORTNESS OF BREATH: 0
NERVOUS/ANXIOUS: 0

## 2025-02-07 ASSESSMENT — PAIN SCALES - GENERAL: PAINLEVEL_OUTOF10: 0-NO PAIN

## 2025-02-07 NOTE — PROGRESS NOTES
Subjective   Patient ID: James Brambila is a 49 y.o. male who presents for Anxiety and Med Refill (Tizanidine, Buspar,Hydrochlorothiazide ( mail order)/dd).    Hx of NIDDM, HTN, GERD, Asthma, MARITZA (on CPAP)    Other providers: Dr. Kem Natarajan (Sleep specialist), Dr. Bowie (PM)    Depression with Anxiety: Last visit decreased Buspar down to 10 mg BID (due to  upset) and started on 150 mg Wellbutrin. Stopped Wellbutrin due to feeling angry/irritation. Patient would like to re-try Lexapro, felt like a zombie on 20 mg dose, but felt okay on 10 mg. Patient has also tried and failed Effexor (did not like feeling on), Prozac (sweating), Cymbalta (sweating), Trazodone (night terrors) and Zoloft (weight gain) as well. Patient feels has low motivation and some mild depression with brain fog. Denies SI.     HTN: controlled. Last started on 12.5 mg hydrochlorothiazide. Also on 20 mg Olmesartan. No medication side effects. Home BP, not checking. +SOB (but feels could be from anxiety) and mild leg edema. Denies chest pain, heart palpitations, dizziness, headaches, changes of vision, syncope.    DM: controlled. Last A1C 6.6 (9/26/24)-->6.2 (1/4/25). On 5 mg Mounjaro (diarrhea once a week). Also on 1000 mg Metformin BID. Previously stopped. GI upset of Ozempic. Blood sugars in the morning is 120 -150. Last microalbumin normal (9/26/24) and GFR 88 (9/2024). On ARB yes. Not on statin but CT calcium score was 0 (4/17/24). Last eye exam 7/2024 with Ophthalmologist and foot exam today 9/26/24. PNA UTD today. Patient goal is too lose more weight.. Interested in DM educator. Denies hypoglycemic incidents, polyphagia, polyuria, polydipsia.    Not discussed:  Asthma: using Dulera daily and albuterol prn. No exacerbations in the last 6 months. Patient did not like Advair.   MARTIZA: co-managed by sleep specialist and received new CPAP and sleep is improving.                Review of Systems   Constitutional:  Negative for fatigue.   Eyes:  " Negative for visual disturbance.   Respiratory:  Negative for shortness of breath.    Cardiovascular:  Negative for chest pain, palpitations and leg swelling.   Gastrointestinal:  Positive for diarrhea and nausea. Negative for vomiting.   Neurological:  Negative for dizziness, syncope and light-headedness.   Psychiatric/Behavioral:  Positive for dysphoric mood. Negative for suicidal ideas. The patient is not nervous/anxious.        Objective   /76   Pulse 81   Ht 1.803 m (5' 11\")   Wt 137 kg (302 lb)   SpO2 99%   BMI 42.12 kg/m²     Physical Exam  Constitutional:       General: He is not in acute distress.     Appearance: Normal appearance.   Cardiovascular:      Rate and Rhythm: Normal rate and regular rhythm.      Heart sounds: No murmur heard.  Pulmonary:      Effort: Pulmonary effort is normal.      Breath sounds: Normal breath sounds. No wheezing, rhonchi or rales.   Skin:     General: Skin is warm and dry.      Findings: No rash.   Neurological:      Mental Status: He is alert.   Psychiatric:         Mood and Affect: Mood and affect normal.         Assessment/Plan   Diagnoses and all orders for this visit:  Hypertension, essential  -     olmesartan-hydrochlorothiazide (BENIcar HCT) 20-12.5 mg tablet; Take 1 tablet by mouth once daily.  Depression with anxiety  -     busPIRone (Buspar) 10 mg tablet; Take 1 tablet (10 mg) by mouth 2 times a day.  -     escitalopram (Lexapro) 10 mg tablet; Take 1 tablet (10 mg) by mouth once daily.  -     escitalopram (Lexapro) 5 mg tablet; Take 1 tablet (5 mg) by mouth once daily.  Anxiety  -     busPIRone (Buspar) 10 mg tablet; Take 1 tablet (10 mg) by mouth 2 times a day.  Chronic bilateral low back pain without sciatica  -     tiZANidine (Zanaflex) 2 mg tablet; Take 1 tablet (2 mg) by mouth 2 times a day as needed for muscle spasms.    Decrease Metformin to once a day to see if improves diarrhea. Started Lexapro, will take 5 mg for at least one week then can " increase to 10 mg if patient wants. Stay on current BP pill but sent combination med of Olmesartan-hydrochlorothiazide. Follow-up 2/3 months for DM/A1c/Anxiety/depression

## 2025-02-07 NOTE — PROGRESS NOTES
Pt is a 50 yo M with PMH including HTN and depression following up after initiating hydrochlorothiazide to regimen and decreasing buspar to 10 mg+ adding Wellbutrin. He only took wellbutrin for a week stated he felt he was more on edge and sensitive so he stopped on his own. He is still taking buspar 10mg stating it is only controlling the anxiety but not the PTSD thoughts. He denies any suicidal or homicidal ideation. He states he gets enough sleep but still feel fatigued throughout the day. He states diet is normal for him. He states mounjaro and metformin is causing nausea and diarrhea about 1x/week 5 days after mounjaro injection. He is taking his hydrochlorothiazide as prescribed and states tolerating well. He denies any c/p, sob, fever, chills, h/a, dizziness or other constitutional symptoms.       ROS:   General: positive for fatigue, no fever   Cardiovascular: no chest pain, no palpitations   Respiratory: no cough, no sob, no wheezing   GI: positive for  n/v, positive for diarrhea, no constipation,no abdominal pain   Neuro: no dizziness, no numbness, no paresthesias   Psych: no suicidal or homicidal ideation    Physical:   Appearance: well appearing, well nourished,  NAD   Heart: no murmurs, rubs, gallops, NL S1-S2   Lung: no rhonchi, rales,or other adventitious sounds     Plan  - advise pt to follow up in 2-3 months  - advise pt to taper metformin dose   - advise pt to follow up if experiencing any adverse side effects or sudden changes   - advise pt to follow up with psychiatry and add lexapro to regimen

## 2025-02-10 ENCOUNTER — APPOINTMENT (OUTPATIENT)
Dept: SLEEP MEDICINE | Facility: CLINIC | Age: 50
End: 2025-02-10
Payer: COMMERCIAL

## 2025-02-10 VITALS
HEIGHT: 71 IN | WEIGHT: 297 LBS | OXYGEN SATURATION: 99 % | SYSTOLIC BLOOD PRESSURE: 130 MMHG | HEART RATE: 82 BPM | BODY MASS INDEX: 41.58 KG/M2 | DIASTOLIC BLOOD PRESSURE: 76 MMHG

## 2025-02-10 DIAGNOSIS — G47.33 OBSTRUCTIVE SLEEP APNEA (ADULT) (PEDIATRIC): Primary | ICD-10-CM

## 2025-02-10 DIAGNOSIS — G25.81 RESTLESS LEG SYNDROME: ICD-10-CM

## 2025-02-10 PROCEDURE — 3078F DIAST BP <80 MM HG: CPT | Performed by: INTERNAL MEDICINE

## 2025-02-10 PROCEDURE — 3075F SYST BP GE 130 - 139MM HG: CPT | Performed by: INTERNAL MEDICINE

## 2025-02-10 PROCEDURE — 3008F BODY MASS INDEX DOCD: CPT | Performed by: INTERNAL MEDICINE

## 2025-02-10 PROCEDURE — 1036F TOBACCO NON-USER: CPT | Performed by: INTERNAL MEDICINE

## 2025-02-10 PROCEDURE — 99214 OFFICE O/P EST MOD 30 MIN: CPT | Performed by: INTERNAL MEDICINE

## 2025-02-10 ASSESSMENT — SLEEP AND FATIGUE QUESTIONNAIRES
HOW LIKELY ARE YOU TO NOD OFF OR FALL ASLEEP WHILE LYING DOWN TO REST IN THE AFTERNOON WHEN CIRCUMSTANCES PERMIT: MODERATE CHANCE OF DOZING
HOW LIKELY ARE YOU TO NOD OFF OR FALL ASLEEP WHILE SITTING AND TALKING TO SOMEONE: WOULD NEVER DOZE
HOW LIKELY ARE YOU TO NOD OFF OR FALL ASLEEP WHILE SITTING AND READING: SLIGHT CHANCE OF DOZING
HOW LIKELY ARE YOU TO NOD OFF OR FALL ASLEEP WHILE WATCHING TV: SLIGHT CHANCE OF DOZING
HOW LIKELY ARE YOU TO NOD OFF OR FALL ASLEEP WHILE SITTING QUIETLY AFTER LUNCH WITHOUT ALCOHOL: SLIGHT CHANCE OF DOZING
SITING INACTIVE IN A PUBLIC PLACE LIKE A CLASS ROOM OR A MOVIE THEATER: SLIGHT CHANCE OF DOZING
ESS-CHAD TOTAL SCORE: 7
HOW LIKELY ARE YOU TO NOD OFF OR FALL ASLEEP WHEN YOU ARE A PASSENGER IN A CAR FOR AN HOUR WITHOUT A BREAK: SLIGHT CHANCE OF DOZING
HOW LIKELY ARE YOU TO NOD OFF OR FALL ASLEEP IN A CAR, WHILE STOPPED FOR A FEW MINUTES IN TRAFFIC: WOULD NEVER DOZE

## 2025-02-10 ASSESSMENT — PAIN SCALES - GENERAL: PAINLEVEL_OUTOF10: 0-NO PAIN

## 2025-02-10 NOTE — ASSESSMENT & PLAN NOTE
- Alvino Brambila  has sleep apnea and requires treatment.  - Alvino Brambila demonstrates good compliance and benefit from PAP therapy  - Continue Auto PAP 6-16 cmH20   - Order for renewal of PAP supplies placed through [employer]    - Follow-up in 12 months

## 2025-02-10 NOTE — PROGRESS NOTES
"     Patient: Alvino Brambila    [id number]  : 1975 -- AGE 49 y.o.    Provider: Kem Natarajan MD     Location [facility]   Service Date: 2/10/2025              [facility]  Followup Visit Note  Subjective   Patient ID: Alvino Louis" is a 49 y.o. male who presents for Sleep Apnea and Pap Adherence Followup.  HPI    Prior Sleep History:  2017: Diagnostic polysomnography  2022: Titration: CPAP 10 cm H2O  10/25/2022: Office visit: Dr. Ben Chandra: Seen for RLS, and, MARITZA.  Ordered ferritin, CBC, started with normal    Current Sleep History:  Alvino Louis" presents for follow-up on the management of his sleep apnea which is currently being managed with positive airway pressure therapy.     A downloaded compliance report was reviewed and was interpreted by myself as follows:  > 4 hour compliance was 100 %, with an average use of 7 hours and 37 minutes, with a residual AHI 0.6 on AutoPAP 6-16 cmH2O .   He is using a [employer] dream[employer] nasal cushion and likes the fit of that mask. He has not felt the need to nap as much since getting his new equipment.  He reports less daytime sleepiness    Alvino Louis" reports  good benefit from his device.    ESS: 7     Review of Systems  Review of systems negative except as per HPI  Objective   /76   Pulse 82   Ht 1.803 m (5' 11\")   Wt 135 kg (297 lb)   SpO2 99%   BMI 41.42 kg/m²    PREVIOUS WEIGHTS:  Wt Readings from Last 3 Encounters:   02/10/25 135 kg (297 lb)   25 137 kg (302 lb)   25 140 kg (309 lb 3.2 oz)       Physical Exam  PHYSICAL EXAM: GENERAL: alert pleasant and cooperative no acute distress  PSYCH EXAM: alert,oriented, in NAD with a full range of affect, normal behavior and no psychotic features    No results found for: \"IRON\", \"TIBC\", \"FERRITIN\"     Assessment/Plan   Problem List Items Addressed This Visit             ICD-10-CM    Restless leg syndrome G25.81 "     Currently not on any therapy for this at this time.  He continues to demonstrate daytime sleepiness and we will need to check iron studies and a ferritin         Obstructive sleep apnea (adult) (pediatric) - Primary G47.33     - Alvino Brambila  has sleep apnea and requires treatment.  - Alvino Brambila demonstrates good compliance and benefit from PAP therapy  - Continue Auto PAP 6-16 cmH20   - Order for renewal of PAP supplies placed through [employer]    - Follow-up in 12 months          Relevant Orders    Positive Airway Pressure (PAP) Therapy

## 2025-02-10 NOTE — ASSESSMENT & PLAN NOTE
Currently not on any therapy for this at this time.  He continues to demonstrate daytime sleepiness and we will need to check iron studies and a ferritin

## 2025-02-16 DIAGNOSIS — E11.9 TYPE 2 DIABETES MELLITUS WITHOUT COMPLICATION, WITHOUT LONG-TERM CURRENT USE OF INSULIN (MULTI): ICD-10-CM

## 2025-02-17 RX ORDER — TIRZEPATIDE 5 MG/.5ML
INJECTION, SOLUTION SUBCUTANEOUS
Qty: 6 ML | Refills: 1 | Status: SHIPPED | OUTPATIENT
Start: 2025-02-17

## 2025-03-02 DIAGNOSIS — F41.8 DEPRESSION WITH ANXIETY: ICD-10-CM

## 2025-03-10 DIAGNOSIS — F41.8 DEPRESSION WITH ANXIETY: ICD-10-CM

## 2025-03-10 RX ORDER — ESCITALOPRAM OXALATE 5 MG/1
5 TABLET ORAL DAILY
Qty: 30 TABLET | Refills: 0 | OUTPATIENT
Start: 2025-03-10

## 2025-03-10 RX ORDER — ESCITALOPRAM OXALATE 5 MG/1
5 TABLET ORAL DAILY
Qty: 90 TABLET | Refills: 1 | OUTPATIENT
Start: 2025-03-10

## 2025-03-10 NOTE — TELEPHONE ENCOUNTER
Spoke with patient, he said we can disregard the request - he has enough that was filled from mail order

## 2025-04-26 ASSESSMENT — ENCOUNTER SYMPTOMS
WEIGHT LOSS: 0
SEIZURES: 0
TREMORS: 0
NERVOUS/ANXIOUS: 1
POLYPHAGIA: 0
FATIGUE: 0
BLURRED VISION: 0
BLACKOUTS: 0
HUNGER: 0
CONFUSION: 0
VISUAL CHANGE: 0
SWEATS: 1
SPEECH DIFFICULTY: 0
DIZZINESS: 0
POLYDIPSIA: 0
WEAKNESS: 1
HEADACHES: 0

## 2025-05-02 ENCOUNTER — OFFICE VISIT (OUTPATIENT)
Dept: PRIMARY CARE | Facility: CLINIC | Age: 50
End: 2025-05-02
Payer: COMMERCIAL

## 2025-05-02 VITALS
BODY MASS INDEX: 43.26 KG/M2 | OXYGEN SATURATION: 97 % | WEIGHT: 309 LBS | HEART RATE: 81 BPM | SYSTOLIC BLOOD PRESSURE: 122 MMHG | DIASTOLIC BLOOD PRESSURE: 76 MMHG | HEIGHT: 71 IN

## 2025-05-02 DIAGNOSIS — E11.9 TYPE 2 DIABETES MELLITUS WITHOUT COMPLICATION, WITHOUT LONG-TERM CURRENT USE OF INSULIN: ICD-10-CM

## 2025-05-02 DIAGNOSIS — I10 HYPERTENSION, ESSENTIAL: Primary | ICD-10-CM

## 2025-05-02 DIAGNOSIS — F41.8 DEPRESSION WITH ANXIETY: ICD-10-CM

## 2025-05-02 LAB — POC HEMOGLOBIN A1C: 6.7 % (ref 4.2–6.5)

## 2025-05-02 PROCEDURE — 3008F BODY MASS INDEX DOCD: CPT

## 2025-05-02 PROCEDURE — 99214 OFFICE O/P EST MOD 30 MIN: CPT

## 2025-05-02 PROCEDURE — 1036F TOBACCO NON-USER: CPT

## 2025-05-02 PROCEDURE — 3074F SYST BP LT 130 MM HG: CPT

## 2025-05-02 PROCEDURE — 83036 HEMOGLOBIN GLYCOSYLATED A1C: CPT

## 2025-05-02 PROCEDURE — 3044F HG A1C LEVEL LT 7.0%: CPT

## 2025-05-02 PROCEDURE — 3078F DIAST BP <80 MM HG: CPT

## 2025-05-02 ASSESSMENT — ENCOUNTER SYMPTOMS
FATIGUE: 0
TREMORS: 0
WEAKNESS: 1
DIZZINESS: 0
SEIZURES: 0
SPEECH DIFFICULTY: 0
NERVOUS/ANXIOUS: 1
CONFUSION: 0
PALPITATIONS: 0
POLYDIPSIA: 0
HEADACHES: 0
POLYPHAGIA: 0

## 2025-05-02 ASSESSMENT — PAIN SCALES - GENERAL: PAINLEVEL_OUTOF10: 0-NO PAIN

## 2025-05-02 NOTE — PROGRESS NOTES
"Subjective   Patient ID: James Barmbila is a 49 y.o. male who presents for Diabetes and Anxiety.    Hx of NIDDM, HTN, GERD, Asthma, MARITZA (on CPAP)    Other providers: Dr. Kem Natarajan (Sleep specialist), Dr. Bowie (PM)    Depression with Anxiety: Last visit re-started 10 mg Lexapro (previously 20 mg was too \"zombie like\"). Also on Buspar 10 mg BID (higher dose to GI upset). Patient states noises can be overly bothersome to patient, like neighbor bouncing basketball and seems no medication will help. Patient now open to therapy, given list. Patient has also tried and failed Effexor (did not like feeling on), Prozac (sweating), Cymbalta (sweating), Trazodone (night terrors),  Wellbutrin (angry/irritation)  and Zoloft (weight gain) as well. Patient feels has low motivation and some mild depression with brain fog. Denies SI.     HTN: controlled. On 20-12.5 mg of Olmesartan-hydrochlorothiazide. No medication side effects. Home BP, not checking. +SOB (but feels could be from anxiety). Denies chest pain, leg swelling, heart palpitations, dizziness, headaches, changes of vision, syncope.    DM: controlled. 6.2 (1/4/25)--> 6.7 (5/2/2025). On 5 mg Mounjaro. Decreased Metformin down to 1000 mg only once a day due to diarrhea (which has resolved). Previously stopped. GI upset of Ozempic. Blood sugars in the morning is 120 -160's. Last microalbumin normal (9/26/24) and GFR 88 (9/2024), new order placed. On ARB yes. Not on statin but CT calcium score was 0 (4/17/24). Last eye exam 7/2024 with Ophthalmologist and foot exam 9/26/24. PNA UTD today. Patient goal is too lose more weight. Worked with DM educator. Denies hypoglycemic incidents, polyuria, polydipsia.    Not discussed:  Asthma: using Dulera daily and albuterol prn. No exacerbations in the last 6 months. Patient did not like Advair.   MARITZA: co-managed by sleep specialist and received new CPAP and sleep is improving.              Review of Systems   Constitutional:  " "Negative for fatigue.   Cardiovascular:  Negative for chest pain, palpitations and leg swelling.   Endocrine: Negative for polydipsia, polyphagia and polyuria.   Skin:  Negative for pallor.   Neurological:  Positive for weakness. Negative for dizziness, tremors, seizures, speech difficulty and headaches.   Psychiatric/Behavioral:  Negative for confusion. The patient is nervous/anxious.        Objective   /76   Pulse 81   Ht 1.803 m (5' 11\")   Wt 140 kg (309 lb)   SpO2 97%   BMI 43.10 kg/m²     Physical Exam  Constitutional:       General: He is not in acute distress.     Appearance: Normal appearance.   Cardiovascular:      Rate and Rhythm: Normal rate and regular rhythm.      Heart sounds: No murmur heard.  Pulmonary:      Effort: Pulmonary effort is normal.      Breath sounds: Normal breath sounds. No wheezing, rhonchi or rales.   Musculoskeletal:      Right lower leg: No edema.      Left lower leg: No edema.   Skin:     General: Skin is warm and dry.      Findings: No rash.   Neurological:      Mental Status: He is alert.   Psychiatric:         Mood and Affect: Mood and affect normal.         Assessment/Plan   Diagnoses and all orders for this visit:  Hypertension, essential  Type 2 diabetes mellitus without complication, without long-term current use of insulin  -     Basic metabolic panel; Future  -     POCT glycosylated hemoglobin (Hb A1C) manually resulted  Depression with anxiety  Will keep medications the same for now but patient informed he could try 15 mg Lexapro if he wants (switch to night-time dosing since feels sedating to patient). Also can take bupsar 3x daily instead of twice daily. Given therapist list. Follow-up 3 months for anxiety and depression.        "

## 2025-05-02 NOTE — PROGRESS NOTES
Answers submitted by the patient for this visit:  Diabetes Questionnaire (Submitted on 4/26/2025)  Chief Complaint: Diabetes problem  Diabetes type: type 2  MedicAlert ID: No  Disease duration: 2 Years  blurred vision: No  chest pain: No  fatigue: No  foot paresthesias: No  foot ulcerations: No  polydipsia: No  polyphagia: No  polyuria: No  visual change: No  weakness: Yes  weight loss: No  Symptom course: stable  confusion: No  speech difficulty: No  dizziness: No  nervous/anxious: Yes  headaches: No  hunger: No  mood changes: Yes  pallor: No  seizures: No  tremors: No  sleepiness: No  sweats: Yes  blackouts: No  hospitalization: No  nocturnal hypoglycemia: No  required assistance: No  required glucagon: No  CVA: No  heart disease: No  impotence: No  nephropathy: No  peripheral neuropathy: No  PVD: No  retinopathy: No  CAD risks: family history, hypertension, obesity, sedentary lifestyle, stress  Current treatments: oral agent (dual therapy)  Treatment compliance: all of the time  Home blood tests: 1-2 x per day  Home urines: <1 x per month  Monitoring compliance: excellent  Blood glucose trend: fluctuating minimally  breakfast time: 7-8 am  breakfast glucose level: 140-180  lunch time: 12-1 pm  dinner time: 6-7 pm  Bedtime: 10-11 pm  Weight trend: increasing steadily  Exercise: rarely  Dietitian visit: No  Eye exam current: Yes

## 2025-05-06 LAB
ANION GAP SERPL CALCULATED.4IONS-SCNC: 11 MMOL/L (CALC) (ref 7–17)
BUN SERPL-MCNC: 14 MG/DL (ref 7–25)
BUN/CREAT SERPL: ABNORMAL (CALC) (ref 6–22)
CALCIUM SERPL-MCNC: 9.5 MG/DL (ref 8.6–10.3)
CHLORIDE SERPL-SCNC: 101 MMOL/L (ref 98–110)
CO2 SERPL-SCNC: 26 MMOL/L (ref 20–32)
CREAT SERPL-MCNC: 1.07 MG/DL (ref 0.6–1.29)
EGFRCR SERPLBLD CKD-EPI 2021: 86 ML/MIN/1.73M2
GLUCOSE SERPL-MCNC: 181 MG/DL (ref 65–139)
POTASSIUM SERPL-SCNC: 4.1 MMOL/L (ref 3.5–5.3)
SODIUM SERPL-SCNC: 138 MMOL/L (ref 135–146)

## 2025-07-30 ASSESSMENT — ENCOUNTER SYMPTOMS
WEAKNESS: 0
SWEATS: 1
HUNGER: 0
TREMORS: 0
POLYPHAGIA: 0
SPEECH DIFFICULTY: 0
DIZZINESS: 0
WEIGHT LOSS: 0
NERVOUS/ANXIOUS: 1
HEADACHES: 0
BLACKOUTS: 0
BLURRED VISION: 0
CONFUSION: 0
FATIGUE: 0
POLYDIPSIA: 0
VISUAL CHANGE: 0
SEIZURES: 0

## 2025-08-01 DIAGNOSIS — F41.8 DEPRESSION WITH ANXIETY: ICD-10-CM

## 2025-08-01 DIAGNOSIS — E11.9 TYPE 2 DIABETES MELLITUS WITHOUT COMPLICATION, WITHOUT LONG-TERM CURRENT USE OF INSULIN: ICD-10-CM

## 2025-08-01 DIAGNOSIS — F41.9 ANXIETY: ICD-10-CM

## 2025-08-01 DIAGNOSIS — I10 HYPERTENSION, ESSENTIAL: ICD-10-CM

## 2025-08-01 RX ORDER — OLMESARTAN MEDOXOMIL AND HYDROCHLOROTHIAZIDE 20/12.5 20; 12.5 MG/1; MG/1
1 TABLET ORAL DAILY
Qty: 90 TABLET | Refills: 1 | Status: SHIPPED | OUTPATIENT
Start: 2025-08-01

## 2025-08-01 RX ORDER — BUSPIRONE HYDROCHLORIDE 10 MG/1
10 TABLET ORAL 2 TIMES DAILY
Qty: 180 TABLET | Refills: 1 | Status: SHIPPED | OUTPATIENT
Start: 2025-08-01

## 2025-08-01 RX ORDER — ESCITALOPRAM OXALATE 10 MG/1
10 TABLET ORAL DAILY
Qty: 90 TABLET | Refills: 1 | Status: SHIPPED | OUTPATIENT
Start: 2025-08-01

## 2025-08-01 RX ORDER — METFORMIN HYDROCHLORIDE 1000 MG/1
1000 TABLET ORAL DAILY
Qty: 90 TABLET | Refills: 1 | Status: SHIPPED | OUTPATIENT
Start: 2025-08-01

## 2025-08-01 NOTE — TELEPHONE ENCOUNTER
HPI    Pt ref for YAG OD. States OD vision is worse that OS. States over a period   of time vision got worse. Denies pain/ FOL/ floaters.   Last edited by Christie Elaine on 4/11/2023  2:30 PM.            Assessment /Plan     For exam results, see Encounter Report.    Right posterior capsular opacification  -     Ambulatory referral/consult to Ophthalmology    Pseudophakia, both eyes    Diabetes mellitus type 2 without retinopathy      1. Right posterior capsular opacification  Mild by exam but patient is having symptoms  Discussed possible improvement in vision but may be subtle, pt agrees to proceed     YAG Cap OD today, no complications    F/u 1-2 months, dilate OD    2. Pseudophakia, both eyes  stable    3. Diabetes mellitus type 2 without retinopathy  Diabetes without retinopathy, discussed with patient importance of glucose control and follow up.  Patient voices understanding.                        See attached rx, last appt was 05/02/25

## 2025-08-06 ENCOUNTER — OFFICE VISIT (OUTPATIENT)
Dept: PRIMARY CARE | Facility: CLINIC | Age: 50
End: 2025-08-06
Payer: COMMERCIAL

## 2025-08-06 VITALS
OXYGEN SATURATION: 98 % | DIASTOLIC BLOOD PRESSURE: 76 MMHG | HEART RATE: 64 BPM | SYSTOLIC BLOOD PRESSURE: 130 MMHG | BODY MASS INDEX: 43.1 KG/M2 | WEIGHT: 309 LBS

## 2025-08-06 DIAGNOSIS — I87.2 VENOUS STASIS DERMATITIS OF BOTH LOWER EXTREMITIES: Primary | ICD-10-CM

## 2025-08-06 DIAGNOSIS — F41.8 DEPRESSION WITH ANXIETY: ICD-10-CM

## 2025-08-06 DIAGNOSIS — E11.9 TYPE 2 DIABETES MELLITUS WITHOUT COMPLICATION, WITHOUT LONG-TERM CURRENT USE OF INSULIN: ICD-10-CM

## 2025-08-06 LAB — POC HEMOGLOBIN A1C: 7.4 % (ref 4.2–6.5)

## 2025-08-06 PROCEDURE — 99214 OFFICE O/P EST MOD 30 MIN: CPT

## 2025-08-06 PROCEDURE — 3075F SYST BP GE 130 - 139MM HG: CPT

## 2025-08-06 PROCEDURE — 83036 HEMOGLOBIN GLYCOSYLATED A1C: CPT

## 2025-08-06 PROCEDURE — 1036F TOBACCO NON-USER: CPT

## 2025-08-06 PROCEDURE — 3051F HG A1C>EQUAL 7.0%<8.0%: CPT

## 2025-08-06 PROCEDURE — 3078F DIAST BP <80 MM HG: CPT

## 2025-08-06 RX ORDER — TIRZEPATIDE 7.5 MG/.5ML
7.5 INJECTION, SOLUTION SUBCUTANEOUS WEEKLY
Qty: 6 ML | Refills: 1 | Status: SHIPPED | OUTPATIENT
Start: 2025-08-06

## 2025-08-06 RX ORDER — TRIAMCINOLONE ACETONIDE 1 MG/G
CREAM TOPICAL 2 TIMES DAILY
Qty: 80 G | Refills: 0 | Status: SHIPPED | OUTPATIENT
Start: 2025-08-06

## 2025-08-06 ASSESSMENT — COLUMBIA-SUICIDE SEVERITY RATING SCALE - C-SSRS
6. HAVE YOU EVER DONE ANYTHING, STARTED TO DO ANYTHING, OR PREPARED TO DO ANYTHING TO END YOUR LIFE?: NO
1. IN THE PAST MONTH, HAVE YOU WISHED YOU WERE DEAD OR WISHED YOU COULD GO TO SLEEP AND NOT WAKE UP?: NO
2. HAVE YOU ACTUALLY HAD ANY THOUGHTS OF KILLING YOURSELF?: NO

## 2025-08-06 ASSESSMENT — ENCOUNTER SYMPTOMS
HUNGER: 0
SEIZURES: 0
NERVOUS/ANXIOUS: 1
POLYPHAGIA: 0
DIZZINESS: 0
CONFUSION: 0
POLYDIPSIA: 0
FATIGUE: 0
BLURRED VISION: 0
TREMORS: 0
WEAKNESS: 0
SWEATS: 1
BLACKOUTS: 0
WEIGHT LOSS: 0
HEADACHES: 0
SPEECH DIFFICULTY: 0
VISUAL CHANGE: 0

## 2025-08-06 ASSESSMENT — PAIN SCALES - GENERAL: PAINLEVEL_OUTOF10: 0-NO PAIN

## 2025-08-06 NOTE — PROGRESS NOTES
"Subjective   Patient ID: James Brambila is a 49 y.o. male who presents for Follow-up (Pt is here for follow up / nr) and Leg Pain (Pt would like to discuss bilateral leg pain, for months / nr).    Hx of NIDDM, HTN, GERD, Asthma, AMRITZA (on CPAP)    Other providers: Dr. Kem Natarajan (Sleep specialist), Dr. Bowie (PM)    Acute concerns: Leg pain bilateral lower shin. Patient was at beach past week, not in sun (late afternoon). Patient states looked mildly red, and more sensitive to touch.     Depression with Anxiety: Tried taking Lexapro both at night in the morning, and feels does not like either over the other (previously 20 mg was too \"zombie like\"). Also on Buspar 10 mg BID (higher dose to GI upset). Overall he feels \"neutral\" and \"things are working\" once  in awhile has \"touch of anxiety\" or feels his body \"fighting back over the medication\". Does not want dose adjustments. Recall: Patient states noises can be overly bothersome to patient, like neighbor bouncing basketball and seems no medication will help. Patient now open to therapy, given list. Patient has also tried and failed Effexor (did not like feeling on), Prozac (sweating), Cymbalta (sweating), Trazodone (night terrors),  Wellbutrin (angry/irritation)  and Zoloft (weight gain) as well. Patient feels has low motivation and some mild depression with brain fog. Denies SI.    DM: controlled. 6.2 (1/4/25)--> 6.7 (5/2/2025) --> 7.4 (8/6/2025) On 5 mg Mounjaro. Decreased Metformin down to 1000 mg only once a day due to diarrhea (which has resolved). Previously stopped. GI upset of Ozempic. Blood sugars in the morning is 120 -160's. Last microalbumin normal (9/26/24) and GFR 86 (5/2025), new order placed. On ARB yes. Not on statin but CT calcium score was 0 (4/17/24). Last eye exam 7/2024, due need to schedule with Ophthalmologist and foot exam 8/6/2025. PNA UTD today. Patient goal is too lose more weight. Worked with DM educator. Denies hypoglycemic " Addended by: HAMILTON GEORGES on: 12/1/2022 01:29 PM     Modules accepted: Orders     incidents, polyuria, polydipsia.    Not discussed:  HTN: controlled. On 20-12.5 mg of Olmesartan-hydrochlorothiazide. No medication side effects. Home BP, not checking. +SOB (but feels could be from anxiety). Denies chest pain, leg swelling, heart palpitations, dizziness, headaches, changes of vision, syncope.  Asthma: using Dulera daily and albuterol prn. No exacerbations in the last 6 months. Patient did not like Advair.   MARITZA: co-managed by sleep specialist and received new CPAP and sleep is improving.         Diabetes  He has type 2 diabetes mellitus. No MedicAlert identification noted. Hypoglycemia symptoms include nervousness/anxiousness, sleepiness and sweats. Pertinent negatives for hypoglycemia include no confusion, dizziness, headaches, hunger, mood changes, pallor, seizures, speech difficulty or tremors. Pertinent negatives for diabetes include no blurred vision, no chest pain, no fatigue, no foot paresthesias, no foot ulcerations, no polydipsia, no polyphagia, no polyuria, no visual change, no weakness and no weight loss. Pertinent negatives for hypoglycemia complications include no blackouts, no hospitalization, no nocturnal hypoglycemia, no required assistance and no required glucagon injection. Symptoms are stable. Pertinent negatives for diabetic complications include no CVA, heart disease, impotence, nephropathy, peripheral neuropathy, PVD or retinopathy. Risk factors for coronary artery disease include family history, hypertension, obesity and sedentary lifestyle. Current diabetic treatment includes oral agent (dual therapy). He is compliant with treatment all of the time. His weight is increasing steadily. He has not had a previous visit with a dietitian. He never participates in exercise. He monitors blood glucose at home 1-2 x per day. Blood glucose monitoring compliance is good. His home blood glucose trend is increasing steadily. His breakfast blood glucose is taken between 7-8 am. His  breakfast blood glucose range is generally 140-180 mg/dl. His lunch blood glucose is taken between 1-2 pm. His dinner blood glucose is taken between 6-7 pm. His bedtime blood glucose is taken between 10-11 pm. He does not see a podiatrist.Eye exam is current.        Review of Systems   Constitutional:  Negative for fatigue and weight loss.   Eyes:  Negative for blurred vision.   Cardiovascular:  Negative for chest pain.   Endocrine: Negative for polydipsia, polyphagia and polyuria.   Genitourinary:  Negative for impotence.   Skin:  Negative for pallor.   Neurological:  Negative for dizziness, tremors, seizures, speech difficulty, weakness and headaches.   Psychiatric/Behavioral:  Negative for confusion. The patient is nervous/anxious.        Objective   /76   Pulse 64   Wt 140 kg (309 lb)   SpO2 98%   BMI 43.10 kg/m²     Physical Exam  Constitutional:       General: He is not in acute distress.     Appearance: Normal appearance.     Cardiovascular:      Rate and Rhythm: Normal rate and regular rhythm.      Pulses:           Dorsalis pedis pulses are 2+ on the right side and 2+ on the left side.        Posterior tibial pulses are 2+ on the right side and 2+ on the left side.      Heart sounds: No murmur heard.  Pulmonary:      Effort: Pulmonary effort is normal.      Breath sounds: Normal breath sounds. No wheezing, rhonchi or rales.     Musculoskeletal:      Right lower leg: Edema (trace) present.      Left lower leg: Edema (trace) present.   Feet:      Right foot:      Protective Sensation: 5 sites tested.  5 sites sensed.      Skin integrity: Skin integrity normal.      Left foot:      Protective Sensation: 5 sites tested.  5 sites sensed.      Skin integrity: Skin integrity normal.     Skin:     General: Skin is warm and dry.      Findings: No rash.     Neurological:      Mental Status: He is alert.     Psychiatric:         Mood and Affect: Mood and affect normal.         Assessment/Plan   Diagnoses and  all orders for this visit:  Venous stasis dermatitis of both lower extremities  -     triamcinolone (Kenalog) 0.1 % cream; Apply topically 2 times a day. Apply to affected area 1-2 times daily as needed. Avoid face and groin.  Type 2 diabetes mellitus without complication, without long-term current use of insulin  -     POCT glycosylated hemoglobin (Hb A1C) manually resulted  -     tirzepatide (Mounjaro) 7.5 mg/0.5 mL pen injector; Inject 7.5 mg under the skin 1 (one) time per week.  Depression with anxiety  Patient would like to try 5 mg Lexapro twice a day. Also will increase Mounjaro to 7.5 mg. Sent in cream for dermatitis, call if not improving. Follow-up [facility] for CPE or sooner prn.

## 2026-02-09 ENCOUNTER — APPOINTMENT (OUTPATIENT)
Dept: SLEEP MEDICINE | Facility: CLINIC | Age: 51
End: 2026-02-09
Payer: COMMERCIAL